# Patient Record
Sex: FEMALE | Race: BLACK OR AFRICAN AMERICAN | Employment: PART TIME | URBAN - METROPOLITAN AREA
[De-identification: names, ages, dates, MRNs, and addresses within clinical notes are randomized per-mention and may not be internally consistent; named-entity substitution may affect disease eponyms.]

---

## 2018-06-28 ENCOUNTER — ED HISTORICAL/CONVERTED ENCOUNTER (OUTPATIENT)
Dept: OTHER | Age: 28
End: 2018-06-28

## 2018-07-05 ENCOUNTER — ED HISTORICAL/CONVERTED ENCOUNTER (OUTPATIENT)
Dept: OTHER | Age: 28
End: 2018-07-05

## 2018-10-26 ENCOUNTER — ED HISTORICAL/CONVERTED ENCOUNTER (OUTPATIENT)
Dept: OTHER | Age: 28
End: 2018-10-26

## 2019-09-04 ENCOUNTER — ED HISTORICAL/CONVERTED ENCOUNTER (OUTPATIENT)
Dept: OTHER | Age: 29
End: 2019-09-04

## 2019-09-30 ENCOUNTER — ED HISTORICAL/CONVERTED ENCOUNTER (OUTPATIENT)
Dept: OTHER | Age: 29
End: 2019-09-30

## 2019-10-03 ENCOUNTER — ED HISTORICAL/CONVERTED ENCOUNTER (OUTPATIENT)
Dept: OTHER | Age: 29
End: 2019-10-03

## 2022-11-01 LAB
ANTIBODY SCREEN, EXTERNAL: NEGATIVE
HBSAG, EXTERNAL: NEGATIVE
HIV, EXTERNAL: NEGATIVE
RPR, EXTERNAL: NORMAL
RUBELLA, EXTERNAL: NORMAL
TYPE, ABO & RH, EXTERNAL: NORMAL

## 2023-01-24 ENCOUNTER — HOSPITAL ENCOUNTER (INPATIENT)
Age: 33
LOS: 2 days | Discharge: HOME | End: 2023-01-26
Attending: STUDENT IN AN ORGANIZED HEALTH CARE EDUCATION/TRAINING PROGRAM | Admitting: STUDENT IN AN ORGANIZED HEALTH CARE EDUCATION/TRAINING PROGRAM
Payer: COMMERCIAL

## 2023-01-24 PROBLEM — O36.4XX0: Status: ACTIVE | Noted: 2023-01-24

## 2023-01-24 LAB
ALBUMIN SERPL-MCNC: 3 G/DL (ref 3.5–5)
ALBUMIN/GLOB SERPL: 0.6 (ref 1.1–2.2)
ALP SERPL-CCNC: 69 U/L (ref 45–117)
ALT SERPL-CCNC: 29 U/L (ref 12–78)
AMPHET UR QL SCN: NEGATIVE
ANION GAP SERPL CALC-SCNC: 7 MMOL/L (ref 5–15)
APTT PPP: 26.7 SEC (ref 22.1–31)
AST SERPL-CCNC: 17 U/L (ref 15–37)
BARBITURATES UR QL SCN: NEGATIVE
BASOPHILS # BLD: 0 K/UL (ref 0–0.1)
BASOPHILS NFR BLD: 0 % (ref 0–1)
BENZODIAZ UR QL: NEGATIVE
BILIRUB SERPL-MCNC: 0.2 MG/DL (ref 0.2–1)
BUN SERPL-MCNC: 8 MG/DL (ref 6–20)
BUN/CREAT SERPL: 12 (ref 12–20)
CALCIUM SERPL-MCNC: 9.3 MG/DL (ref 8.5–10.1)
CANNABINOIDS UR QL SCN: NEGATIVE
CHLORIDE SERPL-SCNC: 106 MMOL/L (ref 97–108)
CO2 SERPL-SCNC: 25 MMOL/L (ref 21–32)
COCAINE UR QL SCN: NEGATIVE
CREAT SERPL-MCNC: 0.69 MG/DL (ref 0.55–1.02)
CREAT UR-MCNC: 99 MG/DL
DIFFERENTIAL METHOD BLD: ABNORMAL
DRUG SCRN COMMENT,DRGCM: NORMAL
EOSINOPHIL # BLD: 0.3 K/UL (ref 0–0.4)
EOSINOPHIL NFR BLD: 3 % (ref 0–7)
ERYTHROCYTE [DISTWIDTH] IN BLOOD BY AUTOMATED COUNT: 12.7 % (ref 11.5–14.5)
FETAL BLOOD VOL PATIENT KLEIH BETKE: NORMAL ML
GLOBULIN SER CALC-MCNC: 4.8 G/DL (ref 2–4)
GLUCOSE SERPL-MCNC: 83 MG/DL (ref 65–100)
HCT VFR BLD AUTO: 28.7 % (ref 35–47)
HGB BLD-MCNC: 9.4 G/DL (ref 11.5–16)
IMM GRANULOCYTES # BLD AUTO: 0 K/UL (ref 0–0.04)
IMM GRANULOCYTES NFR BLD AUTO: 0 % (ref 0–0.5)
INR PPP: 1 (ref 0.9–1.1)
LYMPHOCYTES # BLD: 1.4 K/UL (ref 0.8–3.5)
LYMPHOCYTES NFR BLD: 14 % (ref 12–49)
MCH RBC QN AUTO: 28.9 PG (ref 26–34)
MCHC RBC AUTO-ENTMCNC: 32.8 G/DL (ref 30–36.5)
MCV RBC AUTO: 88.3 FL (ref 80–99)
METHADONE UR QL: NEGATIVE
MONOCYTES # BLD: 1.1 K/UL (ref 0–1)
MONOCYTES NFR BLD: 11 % (ref 5–13)
NEUTS SEG # BLD: 7.4 K/UL (ref 1.8–8)
NEUTS SEG NFR BLD: 72 % (ref 32–75)
NRBC # BLD: 0 K/UL (ref 0–0.01)
NRBC BLD-RTO: 0 PER 100 WBC
OPIATES UR QL: NEGATIVE
PCP UR QL: NEGATIVE
PLATELET # BLD AUTO: 380 K/UL (ref 150–400)
PMV BLD AUTO: 9.6 FL (ref 8.9–12.9)
POTASSIUM SERPL-SCNC: 3.8 MMOL/L (ref 3.5–5.1)
PROT SERPL-MCNC: 7.8 G/DL (ref 6.4–8.2)
PROT UR-MCNC: 16 MG/DL (ref 0–11.9)
PROT/CREAT UR-RTO: 0.2
PROTHROMBIN TIME: 10.3 SEC (ref 9–11.1)
RBC # BLD AUTO: 3.25 M/UL (ref 3.8–5.2)
SODIUM SERPL-SCNC: 138 MMOL/L (ref 136–145)
THERAPEUTIC RANGE,PTTT: NORMAL SECS (ref 58–77)
TSH SERPL DL<=0.05 MIU/L-ACNC: 1.6 UIU/ML (ref 0.36–3.74)
WBC # BLD AUTO: 10.3 K/UL (ref 3.6–11)

## 2023-01-24 PROCEDURE — 86146 BETA-2 GLYCOPROTEIN ANTIBODY: CPT

## 2023-01-24 PROCEDURE — 84156 ASSAY OF PROTEIN URINE: CPT

## 2023-01-24 PROCEDURE — 85460 HEMOGLOBIN FETAL: CPT

## 2023-01-24 PROCEDURE — 80053 COMPREHEN METABOLIC PANEL: CPT

## 2023-01-24 PROCEDURE — 86644 CMV ANTIBODY: CPT

## 2023-01-24 PROCEDURE — 85610 PROTHROMBIN TIME: CPT

## 2023-01-24 PROCEDURE — 3E033VJ INTRODUCTION OF OTHER HORMONE INTO PERIPHERAL VEIN, PERCUTANEOUS APPROACH: ICD-10-PCS | Performed by: OBSTETRICS & GYNECOLOGY

## 2023-01-24 PROCEDURE — 84443 ASSAY THYROID STIM HORMONE: CPT

## 2023-01-24 PROCEDURE — 86747 PARVOVIRUS ANTIBODY: CPT

## 2023-01-24 PROCEDURE — 36415 COLL VENOUS BLD VENIPUNCTURE: CPT

## 2023-01-24 PROCEDURE — 80307 DRUG TEST PRSMV CHEM ANLYZR: CPT

## 2023-01-24 PROCEDURE — 86900 BLOOD TYPING SEROLOGIC ABO: CPT

## 2023-01-24 PROCEDURE — 74011250637 HC RX REV CODE- 250/637: Performed by: OBSTETRICS & GYNECOLOGY

## 2023-01-24 PROCEDURE — 85025 COMPLETE CBC W/AUTO DIFF WBC: CPT

## 2023-01-24 PROCEDURE — 85730 THROMBOPLASTIN TIME PARTIAL: CPT

## 2023-01-24 PROCEDURE — 86147 CARDIOLIPIN ANTIBODY EA IG: CPT

## 2023-01-24 PROCEDURE — 65270000029 HC RM PRIVATE

## 2023-01-24 RX ORDER — ACETAMINOPHEN 325 MG/1
650 TABLET ORAL
Status: DISCONTINUED | OUTPATIENT
Start: 2023-01-24 | End: 2023-01-25 | Stop reason: SDUPTHER

## 2023-01-24 RX ORDER — MISOPROSTOL 200 UG/1
400 TABLET ORAL EVERY 4 HOURS
Status: DISCONTINUED | OUTPATIENT
Start: 2023-01-24 | End: 2023-01-24

## 2023-01-24 RX ORDER — DOCUSATE SODIUM 100 MG/1
100 CAPSULE, LIQUID FILLED ORAL DAILY
COMMUNITY
End: 2023-01-26

## 2023-01-24 RX ORDER — MINERAL OIL
120 OIL (ML) ORAL ONCE
Status: ACTIVE | OUTPATIENT
Start: 2023-01-24 | End: 2023-01-25

## 2023-01-24 RX ORDER — ONDANSETRON 2 MG/ML
8 INJECTION INTRAMUSCULAR; INTRAVENOUS
Status: DISCONTINUED | OUTPATIENT
Start: 2023-01-24 | End: 2023-01-25 | Stop reason: SDUPTHER

## 2023-01-24 RX ORDER — MISOPROSTOL 200 UG/1
200 TABLET ORAL EVERY 4 HOURS
Status: DISCONTINUED | OUTPATIENT
Start: 2023-01-24 | End: 2023-01-25

## 2023-01-24 RX ORDER — MISOPROSTOL 200 UG/1
400 TABLET ORAL
Status: DISCONTINUED | OUTPATIENT
Start: 2023-01-24 | End: 2023-01-24

## 2023-01-24 RX ORDER — FERROUS SULFATE 15 MG/ML
15 DROPS ORAL DAILY
COMMUNITY

## 2023-01-24 RX ORDER — MISOPROSTOL 200 UG/1
800 TABLET ORAL ONCE
Status: DISCONTINUED | OUTPATIENT
Start: 2023-01-24 | End: 2023-01-24

## 2023-01-24 RX ORDER — CALCIUM CARBONATE 200(500)MG
400 TABLET,CHEWABLE ORAL
Status: DISCONTINUED | OUTPATIENT
Start: 2023-01-24 | End: 2023-01-26 | Stop reason: HOSPADM

## 2023-01-24 RX ORDER — DOCUSATE SODIUM 100 MG/1
100 CAPSULE, LIQUID FILLED ORAL
Status: DISCONTINUED | OUTPATIENT
Start: 2023-01-24 | End: 2023-01-25 | Stop reason: SDUPTHER

## 2023-01-24 RX ORDER — NALOXONE HYDROCHLORIDE 0.4 MG/ML
0.4 INJECTION, SOLUTION INTRAMUSCULAR; INTRAVENOUS; SUBCUTANEOUS AS NEEDED
Status: DISCONTINUED | OUTPATIENT
Start: 2023-01-24 | End: 2023-01-25 | Stop reason: SDUPTHER

## 2023-01-24 RX ADMIN — MISOPROSTOL 200 MCG: 200 TABLET ORAL at 18:47

## 2023-01-24 RX ADMIN — MISOPROSTOL 200 MCG: 200 TABLET ORAL at 22:48

## 2023-01-24 NOTE — PROGRESS NOTES
Patient wishes to proceed with induction of labor. Review of Systems   All other systems reviewed and are negative. Visit Vitals  /77 (BP 1 Location: Left arm, BP Patient Position: At rest)   Pulse 64   Temp 98.3 °F (36.8 °C)   Resp 20   SpO2 99%       Physical Exam  Constitutional:       General: She is not in acute distress. Appearance: Normal appearance. Neurological:      Mental Status: She is alert. No results found for this or any previous visit (from the past 12 hour(s)). Active Hospital Problems    Diagnosis Date Noted    Intrauterine fetal death at 25 weeks or more of gestation 01/24/2023       She does not want an autopsy. She agrees to placental examination and desires micorarray DNA testing.   Will start induction of labor with misoprostol 400 mcg po q4h      Jasmin Velez MD    1/24/2023

## 2023-01-24 NOTE — PROGRESS NOTES
Ultrasound Report    Date: 1/24/2023    Sonographer: Daniel Bartholomew MD FACOG FACS    Indication: Suspected intrauterine fetal demise, Patient desires a second opinion    Uterus: Normal    Number of Fetuses: 1 No cardiac activity    Fetus Number: 1    Placenta: Left lateral placenta, clear of the cervix    Amniotic Fluid:  Decreased but a pocket > 2cms    Fetal Movement: Absent    Measurements:   BPD: 5.50 cm  22Weeks 5 Days   HC:   23.15 cm  25Weeks 1 Days   AC:   20.13 cm  24Weeks 5 Days   FL:    5.01 cm  27Weeks 0 Days   EFW: 815 Grams <1 Percentile    FHR:  0 BPM  Anatomy:  Head  Shape: Abnormal  Cavum septi pellucidi: Normal  Midline falx: Normal  Thalami: Not Visualized  Lateral ventricle: Not Visualized  Cerebellum: Not Visualized  Cisterna magna: Not Visualized  Face  Upper lip: Not Visualized  Orbits: Normal  Median profile: Not Visualized  Nose: Not Visualized  Nostrils: Not Visualized  Neck:  edema  Thorax  Shape: Abnormal  No masses: Normal  Heart: Has no activity and anatomy is poorly visualized  Abdomen  Stomach: Normal  Has an abnormal shape  Spine: Normal  Limbs  Poorly visualized      CONCLUSION:  Intrauterine fetal demise confirmed  Breech presentation  I reviewed the diagnosis with her and her family  We talked about autopsy, micro-array DNA testing, and placental pathology  She is thinking about her choices.

## 2023-01-24 NOTE — PROGRESS NOTES
1558 Arrived to unit accompanied by significant other to room #212. Tearful, requesting verification of fetal demise upon arrival to unit. Dr. Laural Leventhal made aware of patient's request and ok with [facility] hospitalist performing US at bedside for confirmation. 1600 Dr. Pardeep La to bedside, Karla Oliveira performed. [address] provided to ask questions/voice concerns. Patient/family allotted privacy at this time. 1925 Bedside shift change report given to [address] [place] [address] (oncoming nurse) by Maya Mccabe RN (offgoing nurse). Report included the following information SBAR, Kardex, MAR, and Recent Results.

## 2023-01-24 NOTE — H&P
History & Physical    Name: Irasema Vazquez MRN: [id number]  SSN: [id number]    YOB: 1990  Age: 33 y.o. Sex: female      Subjective:     Reason for Admission:  Pregnancy and fetal demise    History of Present Illness: Ms. Vidhi Calles is a 33 y.o.  female with a fetal demise at ~28 weeks. Patient presented to the office earlier today with S<D and an ultrasound that showed no fetal cardiac activity. No VB, LOF. No other complaints. MAYTE of 2023, EGA 28 1/7 weeks. OB History    Para Term  AB Living   1             SAB IAB Ectopic Molar Multiple Live Births                    # Outcome Date GA Lbr José Miguel/2nd Weight Sex Delivery Anes PTL Lv   1 Current              Hx of MS  No past surgical history on file. Social History     Occupational History    Not on file   Tobacco Use    Smoking status: Not on file    Smokeless tobacco: Not on file   Substance and Sexual Activity    Alcohol use: Not on file    Drug use: Not on file    Sexual activity: Not on file      No family history on file. No Known Allergies  Prior to Admission medications    Not on File        Review of Systems:  A comprehensive review of systems was negative except for that written in the History of Present Illness. Objective:     Vitals:    Vitals:    23 1709   BP: 118/77   Pulse: 64   Resp: 20   Temp: 98.3 °F (36.8 °C)   SpO2: 99%      Temp (24hrs), Av.3 °F (36.8 °C), Min:98.3 °F (36.8 °C), Max:98.3 °F (36.8 °C)    BP  Min: 118/77  Max: 118/77     Physical Exam:  Patient without distress. Abdomen: soft, nontender  Fundus: soft and non tender  Perineum: blood absent, amniotic fluid absent  Lower Extremities:  - Edema No   - No cords or calf tenderness.      Membranes:  Intact  Uterine Activity:  None  Fetal Heart Rate:  absent       Lab/Data Review:  Recent Results (from the past 24 hour(s))   CBC WITH AUTOMATED DIFF    Collection Time: 23  5:30 PM   Result Value Ref Range    WBC 10.3 3.6 - 11.0 K/uL    RBC 3.25 (L) 3.80 - 5.20 M/uL    HGB 9.4 (L) 11.5 - 16.0 g/dL    HCT 28.7 (L) 35.0 - 47.0 %    MCV 88.3 80.0 - 99.0 FL    MCH 28.9 26.0 - 34.0 PG    MCHC 32.8 30.0 - 36.5 g/dL    RDW 12.7 11.5 - 14.5 %    PLATELET 990 094 - 894 K/uL    MPV 9.6 8.9 - 12.9 FL    NRBC 0.0 0  WBC    ABSOLUTE NRBC 0.00 0.00 - 0.01 K/uL    NEUTROPHILS 72 32 - 75 %    LYMPHOCYTES 14 12 - 49 %    MONOCYTES 11 5 - 13 %    EOSINOPHILS 3 0 - 7 %    BASOPHILS 0 0 - 1 %    IMMATURE GRANULOCYTES 0 0.0 - 0.5 %    ABS. NEUTROPHILS 7.4 1.8 - 8.0 K/UL    ABS. LYMPHOCYTES 1.4 0.8 - 3.5 K/UL    ABS. MONOCYTES 1.1 (H) 0.0 - 1.0 K/UL    ABS. EOSINOPHILS 0.3 0.0 - 0.4 K/UL    ABS. BASOPHILS 0.0 0.0 - 0.1 K/UL    ABS. IMM. GRANS. 0.0 0.00 - 0.04 K/UL    DF AUTOMATED         Assessment and Plan: IUP at 28 1/7 weeks with IUFD--for induction. Will get misoprostol 200-400 po q4 hours.       Active Problems:    Intrauterine fetal death at 25 weeks or more of gestation (1/24/2023)       Fetal demise

## 2023-01-25 ENCOUNTER — ANESTHESIA EVENT (OUTPATIENT)
Dept: LABOR AND DELIVERY | Age: 33
End: 2023-01-25
Payer: COMMERCIAL

## 2023-01-25 ENCOUNTER — ANESTHESIA (OUTPATIENT)
Dept: LABOR AND DELIVERY | Age: 33
End: 2023-01-25
Payer: COMMERCIAL

## 2023-01-25 LAB
ABO + RH BLD: NORMAL
BLOOD GROUP ANTIBODIES SERPL: NORMAL
SPECIMEN EXP DATE BLD: NORMAL

## 2023-01-25 PROCEDURE — 74011250637 HC RX REV CODE- 250/637: Performed by: OBSTETRICS & GYNECOLOGY

## 2023-01-25 PROCEDURE — 65410000002 HC RM PRIVATE OB

## 2023-01-25 PROCEDURE — 74011250637 HC RX REV CODE- 250/637: Performed by: STUDENT IN AN ORGANIZED HEALTH CARE EDUCATION/TRAINING PROGRAM

## 2023-01-25 PROCEDURE — 65270000029 HC RM PRIVATE

## 2023-01-25 PROCEDURE — 74011000250 HC RX REV CODE- 250: Performed by: NURSE ANESTHETIST, CERTIFIED REGISTERED

## 2023-01-25 PROCEDURE — 74011000250 HC RX REV CODE- 250

## 2023-01-25 PROCEDURE — 74011000250 HC RX REV CODE- 250: Performed by: STUDENT IN AN ORGANIZED HEALTH CARE EDUCATION/TRAINING PROGRAM

## 2023-01-25 PROCEDURE — 74011250636 HC RX REV CODE- 250/636: Performed by: STUDENT IN AN ORGANIZED HEALTH CARE EDUCATION/TRAINING PROGRAM

## 2023-01-25 PROCEDURE — 88307 TISSUE EXAM BY PATHOLOGIST: CPT

## 2023-01-25 PROCEDURE — 74011250636 HC RX REV CODE- 250/636: Performed by: OBSTETRICS & GYNECOLOGY

## 2023-01-25 RX ORDER — MISOPROSTOL 200 UG/1
800 TABLET ORAL ONCE
Status: COMPLETED | OUTPATIENT
Start: 2023-01-25 | End: 2023-01-25

## 2023-01-25 RX ORDER — LIDOCAINE HYDROCHLORIDE AND EPINEPHRINE 15; 5 MG/ML; UG/ML
INJECTION, SOLUTION EPIDURAL AS NEEDED
Status: DISCONTINUED | OUTPATIENT
Start: 2023-01-25 | End: 2023-01-25 | Stop reason: HOSPADM

## 2023-01-25 RX ORDER — DOCUSATE SODIUM 100 MG/1
100 CAPSULE, LIQUID FILLED ORAL
Status: DISCONTINUED | OUTPATIENT
Start: 2023-01-25 | End: 2023-01-26 | Stop reason: HOSPADM

## 2023-01-25 RX ORDER — SODIUM CHLORIDE 0.9 % (FLUSH) 0.9 %
5-40 SYRINGE (ML) INJECTION AS NEEDED
Status: DISCONTINUED | OUTPATIENT
Start: 2023-01-25 | End: 2023-01-26 | Stop reason: HOSPADM

## 2023-01-25 RX ORDER — OXYTOCIN/RINGER'S LACTATE 30/500 ML
87.3 PLASTIC BAG, INJECTION (ML) INTRAVENOUS AS NEEDED
Status: DISCONTINUED | OUTPATIENT
Start: 2023-01-25 | End: 2023-01-26 | Stop reason: HOSPADM

## 2023-01-25 RX ORDER — OXYTOCIN/RINGER'S LACTATE 30/500 ML
10 PLASTIC BAG, INJECTION (ML) INTRAVENOUS AS NEEDED
Status: COMPLETED | OUTPATIENT
Start: 2023-01-25 | End: 2023-01-25

## 2023-01-25 RX ORDER — OXYTOCIN/RINGER'S LACTATE 30/500 ML
87.3 PLASTIC BAG, INJECTION (ML) INTRAVENOUS AS NEEDED
Status: DISCONTINUED | OUTPATIENT
Start: 2023-01-25 | End: 2023-01-25 | Stop reason: SDUPTHER

## 2023-01-25 RX ORDER — FENTANYL/BUPIVACAINE/NS/PF 2-1250MCG
1-16 PREFILLED PUMP RESERVOIR EPIDURAL CONTINUOUS
Status: DISCONTINUED | OUTPATIENT
Start: 2023-01-25 | End: 2023-01-26 | Stop reason: HOSPADM

## 2023-01-25 RX ORDER — SODIUM CHLORIDE 0.9 % (FLUSH) 0.9 %
5-40 SYRINGE (ML) INJECTION EVERY 8 HOURS
Status: DISCONTINUED | OUTPATIENT
Start: 2023-01-25 | End: 2023-01-26 | Stop reason: HOSPADM

## 2023-01-25 RX ORDER — IBUPROFEN 800 MG/1
800 TABLET ORAL
Status: DISCONTINUED | OUTPATIENT
Start: 2023-01-25 | End: 2023-01-26 | Stop reason: HOSPADM

## 2023-01-25 RX ORDER — NALOXONE HYDROCHLORIDE 0.4 MG/ML
0.4 INJECTION, SOLUTION INTRAMUSCULAR; INTRAVENOUS; SUBCUTANEOUS AS NEEDED
Status: DISCONTINUED | OUTPATIENT
Start: 2023-01-25 | End: 2023-01-26 | Stop reason: HOSPADM

## 2023-01-25 RX ORDER — SODIUM CHLORIDE, SODIUM LACTATE, POTASSIUM CHLORIDE, CALCIUM CHLORIDE 600; 310; 30; 20 MG/100ML; MG/100ML; MG/100ML; MG/100ML
125 INJECTION, SOLUTION INTRAVENOUS CONTINUOUS
Status: DISCONTINUED | OUTPATIENT
Start: 2023-01-25 | End: 2023-01-26 | Stop reason: HOSPADM

## 2023-01-25 RX ORDER — MISOPROSTOL 200 UG/1
400 TABLET ORAL
Status: DISCONTINUED | OUTPATIENT
Start: 2023-01-25 | End: 2023-01-25

## 2023-01-25 RX ORDER — BUPIVACAINE HYDROCHLORIDE 2.5 MG/ML
INJECTION, SOLUTION EPIDURAL; INFILTRATION; INTRACAUDAL AS NEEDED
Status: DISCONTINUED | OUTPATIENT
Start: 2023-01-25 | End: 2023-01-25 | Stop reason: HOSPADM

## 2023-01-25 RX ORDER — ACETAMINOPHEN 325 MG/1
650 TABLET ORAL
Status: DISCONTINUED | OUTPATIENT
Start: 2023-01-25 | End: 2023-01-26 | Stop reason: HOSPADM

## 2023-01-25 RX ORDER — ONDANSETRON 2 MG/ML
4 INJECTION INTRAMUSCULAR; INTRAVENOUS ONCE
Status: COMPLETED | OUTPATIENT
Start: 2023-01-25 | End: 2023-01-25

## 2023-01-25 RX ORDER — NALOXONE HYDROCHLORIDE 0.4 MG/ML
0.4 INJECTION, SOLUTION INTRAMUSCULAR; INTRAVENOUS; SUBCUTANEOUS AS NEEDED
Status: DISCONTINUED | OUTPATIENT
Start: 2023-01-25 | End: 2023-01-25 | Stop reason: SDUPTHER

## 2023-01-25 RX ORDER — FENTANYL CITRATE 50 UG/ML
100 INJECTION, SOLUTION INTRAMUSCULAR; INTRAVENOUS ONCE
Status: COMPLETED | OUTPATIENT
Start: 2023-01-25 | End: 2023-01-25

## 2023-01-25 RX ORDER — ONDANSETRON 2 MG/ML
4 INJECTION INTRAMUSCULAR; INTRAVENOUS
Status: DISCONTINUED | OUTPATIENT
Start: 2023-01-25 | End: 2023-01-26 | Stop reason: HOSPADM

## 2023-01-25 RX ORDER — MISOPROSTOL 200 UG/1
400 TABLET ORAL EVERY 4 HOURS
Status: DISCONTINUED | OUTPATIENT
Start: 2023-01-25 | End: 2023-01-25

## 2023-01-25 RX ORDER — MISOPROSTOL 100 UG/1
TABLET ORAL
Status: COMPLETED
Start: 2023-01-25 | End: 2023-01-25

## 2023-01-25 RX ORDER — OXYTOCIN/RINGER'S LACTATE 30/500 ML
10 PLASTIC BAG, INJECTION (ML) INTRAVENOUS AS NEEDED
Status: DISCONTINUED | OUTPATIENT
Start: 2023-01-25 | End: 2023-01-26 | Stop reason: HOSPADM

## 2023-01-25 RX ORDER — SIMETHICONE 80 MG
80 TABLET,CHEWABLE ORAL
Status: DISCONTINUED | OUTPATIENT
Start: 2023-01-25 | End: 2023-01-26 | Stop reason: HOSPADM

## 2023-01-25 RX ORDER — DIPHENHYDRAMINE HYDROCHLORIDE 50 MG/ML
25 INJECTION, SOLUTION INTRAMUSCULAR; INTRAVENOUS
Status: DISCONTINUED | OUTPATIENT
Start: 2023-01-25 | End: 2023-01-26 | Stop reason: HOSPADM

## 2023-01-25 RX ADMIN — MISOPROSTOL 400 MCG: 200 TABLET ORAL at 11:05

## 2023-01-25 RX ADMIN — OXYTOCIN 10000 MILLI-UNITS: 10 INJECTION INTRAVENOUS at 11:41

## 2023-01-25 RX ADMIN — MISOPROSTOL 800 MCG: 200 TABLET ORAL at 12:08

## 2023-01-25 RX ADMIN — MISOPROSTOL 400 MCG: 200 TABLET ORAL at 07:34

## 2023-01-25 RX ADMIN — MISOPROSTOL 800 MCG: 100 TABLET ORAL at 12:08

## 2023-01-25 RX ADMIN — MISOPROSTOL 400 MCG: 200 TABLET ORAL at 02:46

## 2023-01-25 RX ADMIN — Medication 10 ML/HR: at 08:22

## 2023-01-25 RX ADMIN — ONDANSETRON HYDROCHLORIDE 4 MG: 2 SOLUTION INTRAMUSCULAR; INTRAVENOUS at 04:30

## 2023-01-25 RX ADMIN — CEFAZOLIN 2 G: 1 INJECTION, POWDER, FOR SOLUTION INTRAMUSCULAR; INTRAVENOUS at 14:13

## 2023-01-25 RX ADMIN — LIDOCAINE HYDROCHLORIDE AND EPINEPHRINE 3 ML: 15; 5 INJECTION, SOLUTION EPIDURAL at 07:33

## 2023-01-25 RX ADMIN — BUPIVACAINE HYDROCHLORIDE 7 ML: 2.5 INJECTION, SOLUTION EPIDURAL; INFILTRATION; INTRACAUDAL; PERINEURAL at 07:35

## 2023-01-25 RX ADMIN — FENTANYL CITRATE 100 MCG: 50 INJECTION, SOLUTION INTRAMUSCULAR; INTRAVENOUS at 05:53

## 2023-01-25 NOTE — PROGRESS NOTES
Spiritual Care Assessment/Progress Note  [address]      NAME: Jasmeet Cavanaugh      MRN: [id number]  AGE: 33 y.o. SEX: female  Sikhism Affiliation: No preference   Language: English     2023     Total Time (in minutes): 45     Spiritual Assessment begun in [facility] 2 LABOR & DELIVERY through conversation with:         [x]Patient        [x] Family    [] Friend(s)        Reason for Consult: Death,  loss     Spiritual beliefs: (Please include comment if needed)     [x] Identifies with a remington tradition:    Anglican     [x] Supported by a remington community:           [] Claims no spiritual orientation:           [] Seeking spiritual identity:                [] Adheres to an individual form of spirituality:           [] Not able to assess:                           Identified resources for coping:      [x] Prayer                               [] Music                  [] Guided Imagery     [x] Family/friends                 [] Pet visits     [] Devotional reading                         [] Unknown     [] Other:                                               Interventions offered during this visit: (See comments for more details)    Patient Interventions: Affirmation of emotions/emotional suffering, Prayer (assurance of),  loss     Family/Friend(s):  Affirmation of emotions/emotional suffering, Prayer (assurance of)     Plan of Care:     [] Support spiritual and/or cultural needs    [] Support AMD and/or advance care planning process      [] Support grieving process   [] Coordinate Rites and/or Rituals    [] Coordination with community clergy   [] No spiritual needs identified at this time   [] Detailed Plan of Care below (See Comments)  [] Make referral to Music Therapy  [] Make referral to Pet Therapy     [] Make referral to Addiction services  [] Make referral to [facility]  [] Make referral to Spiritual Care Partner  [] No future visits requested        [x] Contact Spiritual Care for further referrals     Comments:  Received a phone message notifying Spiritual Care of a fetal demise. Reviewed patient's chart and spoke with patient's nurse. Pt, her Rosemary Ozzy, penny's mother, sister and toddler were present. Pt was in the process of induction. The baby's name is [place]ah [place]. Pt shared her penny's mother is her . Offered condolences to the family. Offered assurance of prayer. Provided a ministry of presence. Collaborated with staff. Please contact Spiritual Care for further referrals.     [address], [address], [address], [facility]  Staff   Paging service: [phone] (AUREA)

## 2023-01-25 NOTE — PROGRESS NOTES
[place] of Care Note    Irasema Vazquez  [id number]  1990   28w2d      S:  Epidural in place, feeling better. Very tired    O:    Visit Vitals  BP (!) 117/56   Pulse 65   Temp 98.9 °F (37.2 °C)   Resp 17   Ht 5' 9\" (1.753 m)   Wt 95.3 kg (210 lb)   SpO2 95%   BMI 31.01 kg/m²     Cervical Exam  Dilation (cm): 0  Eff: 50 %  Station: 0  Vaginal exam done by? : Yaw Vila MD  Membrane Status: Intact      A/P:  33 y.o.  @ 28w2d, undergoing IOL for IUFD   -IOL:    >s/p cytotec 200mcg x2, 400mcg x 2 overnight    >Continue 400mcg q3h     >SVE closed at last check 0430 by Ob hospitalist  -Pain control: PCEA in place, functioning well  -IUFD labs ordered: T&S, CBC, PIH labs, P:C, TSH, KB, Utox coags, glucose. ALL NORMAL thus far. Awaiting Parvovirus, CMV, APAS labs (send out)  -Patient does not desire autopsy. Desires pathology of the placenta.  Still considering microarray testing, unsure.  -Unsure regarding disposition of remains: cremation vs  vs hospital disposition  -FOB present and supportive    Juan Pablo Chaudhry MD  [facility]

## 2023-01-25 NOTE — PROGRESS NOTES
Received a page from the patient' nurse, Norah Anaya, that the patient delivered a baby girl. Patient and family shared that this is God's will. Provided support to the family. Offered condolences. Offered an empathetic presence. Offered assurance of prayer. Advised of  availability. Collaborated with staff. Please contact Spiritual Care for further referrals.     [address], [address], [address], [facility]  Staff   Paging service: [phone] (AUREA)

## 2023-01-25 NOTE — ANESTHESIA PROCEDURE NOTES
Epidural Block    Reason for block: labor epidural  Staffing  Performed: CRNA   Resident/CRNA: Katelyn Arthur CRNA  Preanesthetic Checklist  Completed: patient identified, IV checked, site marked, risks and benefits discussed, surgical consent, monitors and equipment checked, pre-op evaluation, timeout performed and fire risk safety assessment completed and verbalized  Block Placement  Patient position: sitting  Prep: ChloraPrep  Sterility prep: cap, drape, gloves, hand and mask  Sedation level: no sedation  Patient monitoring: heart rate, frequent blood pressure checks and continuous pulse oximetry  Approach: midline  Location: lumbar  Lumbar location: L4-L5  Epidural  Loss of resistance technique: air  Guidance: landmark technique  Needle  Needle type: Tuohy   Needle gauge: 17 G  Needle length: 9 cm  Needle insertion depth: 6 cm  Catheter type: multi-orifice  Catheter size: 20 G  Catheter at skin depth: 11 cm  Catheter securement method: clear occlusive dressing, stabilization device and surgical tape  Test dose: negative  Assessment  Number of attempts: 1  Procedure assessment: patient tolerated procedure well with no immediate complications  Additional Notes  Epidural easily placed x1 attempt. JALEN at 6 cm, catheter easily threaded to 11cm. Neg heme, neg paresthesia, neg csf.

## 2023-01-25 NOTE — L&D DELIVERY NOTE
Delivery Summary    Patient: Mitcheal Ormond MRN: [id number]  SSN: [id number]    YOB: 1990  Age: 33 y.o. Sex: female       Information for the patient's :  Alana Billy Pending FD [[id number]]     Labor Events:    Labor:      Steroids:     Cervical Ripening Date/Time:       Cervical Ripening Type:     Antibiotics During Labor:     Rupture Identifier:      Rupture Date/Time:       Rupture Type:     Amniotic Fluid Volume:      Amniotic Fluid Description:      Amniotic Fluid Odor:      Induction:         Induction Date/Time:        Indications for Induction:      Augmentation:     Augmentation Date/Time:      Indications for Augmentation:     Labor complications: Additional complications:        Delivery Events:  Indications For Episiotomy:     Episiotomy:     Perineal Laceration(s):     Repaired:     Periurethral Laceration Location:      Repaired:     Labial Laceration Location:     Repaired:     Sulcal Laceration Location:     Repaired:     Vaginal Laceration Location:     Repaired:     Cervical Laceration Location:     Repaired:     Repair Suture:     Number of Repair Packets:     Estimated Blood Loss (ml):  ml   Quantitative Blood Loss (ml)                Delivery Date:     Delivery Time:   Delivery Type:    Sex:      Gestational Age: 34w4d   Delivery Clinician:     Living Status: Fetal Demise   Delivery Location: L&D            APGARS  One minute Five minutes Ten minutes   Skin color: 0   0   0     Heart rate: 0   0   0     Grimace: 0   0   0     Muscle tone: 0   0   0     Breathin   0   0     Totals: 0   0   0         Presentation:      Position:        Resuscitation Method:        Meconium Stained:        Cord Information:    Complications:    Cord around:    Delayed cord clamping?     Cord clamped date/time:   Disposition of Cord Blood:      Blood Gases Sent?:      Placenta:  Date/Time:    Removal:        Appearance:       Wilson Measurements:  Birth Weight: Birth Length:        Head Circumference:        Chest Circumference:       Abdominal Girth: Other Providers:    , Obstetrician;Primary Nurse;Primary [place]away Nurse;Nicu Nurse;Neonatologist;Anesthesiologist;Crna;Nurse Practitioner;Midwife;Nursery Nurse         Group B Strep: No results found for: GRBSEXT, GRBSEXT  Information for the patient's :  Roshni Courtney, Pending FD [[id number]]   No results found for: ABORH, PCTABR, PCTDIG, BILI, ABORHEXT, ABORH   No results for input(s): PCO2CB, PO2CB, HCO3I, SO2I, IBD, PTEMPI, SPECTI, PHICB, ISITE, IDEV, IALLEN in the last 72 hours. Delivery Note:     Called to LDR for prolapsing membranes. Ob hospitalist at bedside. Patient was positioned in dorsal lithotomy. The fetal breech was noted to have already delivered. The fetal vertex was gently flexed and delivered without issue. Apgars 0/0. The fetus was handed off to be cleaned and wrapped per the patient's request. The cord was then clamped and cut. Routine postpartum pitocin was given. The placenta delivered spontaneously but did not appear fully intact. Thus a fundal sweep was performed and additional membranes were extracted. A bedside ultrasound was performed and small area of tissue noted at the fundus. A gentle curette was performed using a Banjo curette with additional removal of membranes. Ancef 2gm to be given for manual sweep. Cytotec 800mcg was given DC. Bleeding noted to be hemostatic. The vagina, and perineum were examined and no laceration was found. Patient desired placental pathology and microarray testing. Tissue was collected for microarray and the placenta was sent off for pathology. Patient declined autopsy.  mL. Patient desired to see and hold her baby, ample time was given for this. No complications. Dr. Stann Councilman delivering.      Tj Brannon MD  [facility]

## 2023-01-25 NOTE — PROCEDURES
Obstetrical Discharge Summary     Name: Siri Saha MRN: [id number]  SSN: [id number]    YOB: 1990  Age: 33 y.o. Sex: female      Admit Date: 2023    Discharge Date: 2023    Attending Physician:  Hermila Lunsford MD     Delivering Physician:  Floyd Pickadr MD     * Admission Diagnoses:   Intrauterine fetal demise @ 28w2d      * Discharge Diagnoses:   Delivery of a viable infant via  by Floyd Pickard MD on 2023. Apgars were 0 and 0. Same as above       Additional Diagnoses:   Hospital Problems as of 2023 Never Reviewed            Codes Class Noted - Resolved POA    Intrauterine fetal death at 25 weeks or more of gestation ICD-10-CM: O38. 4XX0  ICD-9-CM: KQB0321  2023 - Present Unknown          Lab Results   Component Value Date/Time    Rubella, External immune 2022 12:00 AM        There is no immunization history on file for this patient. * Procedures:          * Discharge Condition: good    * Hospital Course: Normal hospital course following the delivery. * Disposition: Home    Discharge Medications:   Current Discharge Medication List          * Follow-up Care/Patient Instructions:   Activity: Activity as tolerated  Diet: Regular Diet  Wound Care: As directed  Followup 2 weeks for PP check        Signed By:  Jamison Padron MD     2023

## 2023-01-25 NOTE — PROGRESS NOTES
Labor Progress Note  Patient seen, fetal heart rate and contraction pattern evaluated, patient examined. Patient complains of pain and pressure  Visit Vitals  BP (!) 116/56 (BP 1 Location: Right upper arm, BP Patient Position: At rest)   Pulse 73   Temp 99.8 °F (37.7 °C)   Resp 18   Ht 5' 9\" (1.753 m)   Wt 95.3 kg (210 lb)   SpO2 99%   BMI 31.01 kg/m²       Physical Exam:    33 y.o.  in no acute distress. Cervical Exam:   Presentation Breech  Dilation 0 cms   Effacement 50 %   Station 0  Membranes: Intact  Uterine Activity:   Frequency: Every 2 - 3 minutes   Duration: 60 seconds   Intensity: Moderate  Fetal Heart Rate: 0         Recent Results (from the past 12 hour(s))   CBC WITH AUTOMATED DIFF    Collection Time: 23  5:30 PM   Result Value Ref Range    WBC 10.3 3.6 - 11.0 K/uL    RBC 3.25 (L) 3.80 - 5.20 M/uL    HGB 9.4 (L) 11.5 - 16.0 g/dL    HCT 28.7 (L) 35.0 - 47.0 %    MCV 88.3 80.0 - 99.0 FL    MCH 28.9 26.0 - 34.0 PG    MCHC 32.8 30.0 - 36.5 g/dL    RDW 12.7 11.5 - 14.5 %    PLATELET 781 579 - 701 K/uL    MPV 9.6 8.9 - 12.9 FL    NRBC 0.0 0  WBC    ABSOLUTE NRBC 0.00 0.00 - 0.01 K/uL    NEUTROPHILS 72 32 - 75 %    LYMPHOCYTES 14 12 - 49 %    MONOCYTES 11 5 - 13 %    EOSINOPHILS 3 0 - 7 %    BASOPHILS 0 0 - 1 %    IMMATURE GRANULOCYTES 0 0.0 - 0.5 %    ABS. NEUTROPHILS 7.4 1.8 - 8.0 K/UL    ABS. LYMPHOCYTES 1.4 0.8 - 3.5 K/UL    ABS. MONOCYTES 1.1 (H) 0.0 - 1.0 K/UL    ABS. EOSINOPHILS 0.3 0.0 - 0.4 K/UL    ABS. BASOPHILS 0.0 0.0 - 0.1 K/UL    ABS. IMM.  GRANS. 0.0 0.00 - 0.04 K/UL    DF AUTOMATED     TYPE & SCREEN    Collection Time: 23  5:30 PM   Result Value Ref Range    Crossmatch Expiration 2023,2359     ABO/Rh(D) B POSITIVE     Antibody screen NEG    TSH 3RD GENERATION    Collection Time: 23  5:30 PM   Result Value Ref Range    TSH 1.60 0.36 - 4.12 uIU/mL   METABOLIC PANEL, COMPREHENSIVE    Collection Time: 23  5:30 PM   Result Value Ref Range    Sodium 138 136 - 145 mmol/L    Potassium 3.8 3.5 - 5.1 mmol/L    Chloride 106 97 - 108 mmol/L    CO2 25 21 - 32 mmol/L    Anion gap 7 5 - 15 mmol/L    Glucose 83 65 - 100 mg/dL    BUN 8 6 - 20 MG/DL    Creatinine 0.69 0.55 - 1.02 MG/DL    BUN/Creatinine ratio 12 12 - 20      eGFR >60 >60 ml/min/1.73m2    Calcium 9.3 8.5 - 10.1 MG/DL    Bilirubin, total 0.2 0.2 - 1.0 MG/DL    ALT (SGPT) 29 12 - 78 U/L    AST (SGOT) 17 15 - 37 U/L    Alk. phosphatase 69 45 - 117 U/L    Protein, total 7.8 6.4 - 8.2 g/dL    Albumin 3.0 (L) 3.5 - 5.0 g/dL    Globulin 4.8 (H) 2.0 - 4.0 g/dL    A-G Ratio 0.6 (L) 1.1 - 2.2     PROTHROMBIN TIME + INR    Collection Time: 01/24/23  5:30 PM   Result Value Ref Range    INR 1.0 0.9 - 1.1      Prothrombin time 10.3 9.0 - 11.1 sec   PTT    Collection Time: 01/24/23  5:30 PM   Result Value Ref Range    aPTT 26.7 22.1 - 31.0 sec    aPTT, therapeutic range     58.0 - 77.0 SECS   FETAL HEMOGLOBIN SCREEN    Collection Time: 01/24/23  5:30 PM   Result Value Ref Range    Feto-maternal hemorrhage scrn NO FETOMATERNAL HEMORRHAGE DETECTED    DRUG SCREEN, URINE    Collection Time: 01/24/23  7:16 PM   Result Value Ref Range    AMPHETAMINES Negative NEG      BARBITURATES Negative NEG      BENZODIAZEPINES Negative NEG      COCAINE Negative NEG      METHADONE Negative NEG      OPIATES Negative NEG      PCP(PHENCYCLIDINE) Negative NEG      THC (TH-CANNABINOL) Negative NEG      Drug screen comment (NOTE)    PROTEIN/CREATININE RATIO, URINE    Collection Time: 01/24/23  7:16 PM   Result Value Ref Range    Protein, urine random 16 (H) 0.0 - 11.9 mg/dL    Creatinine, urine random 99.00 mg/dL    Protein/Creat.  urine Ratio 0.2       Assessment/Plan:  Active Hospital Problems    Diagnosis Date Noted    Intrauterine fetal death at 25 weeks or more of gestation 01/24/2023     Will get epidural     Miguel Angel Kuhn MD  1/25/2023

## 2023-01-25 NOTE — ANESTHESIA PREPROCEDURE EVALUATION
Relevant Problems   No relevant active problems       Anesthetic History   No history of anesthetic complications            Review of Systems / Medical History  Patient summary reviewed, nursing notes reviewed and pertinent labs reviewed    Pulmonary  Within defined limits                 Neuro/Psych             Comments: Multiple sclerosis dx in 2017 - off all medication for pregnancy. Sx's included weakness in legs, bells palsy. Cardiovascular  Within defined limits                     GI/Hepatic/Renal  Within defined limits              Endo/Other  Within defined limits           Other Findings   Comments: Induction of labor due to IUFD at 28 wks           Physical Exam    Airway  Mallampati: III  TM Distance: 4 - 6 cm  Neck ROM: normal range of motion        Cardiovascular    Rhythm: regular  Rate: normal         Dental         Pulmonary                 Abdominal         Other Findings            Anesthetic Plan    ASA: 2  Anesthesia type: epidural            Anesthetic plan and risks discussed with: Patient and Spouse      Risks including headache, backache, failure to work and need for replacement, infection and nerve injury discussed with pt. Also discussed affect of epidural on MS symptoms, smaller dose may be needed. Pt chooses to proceed with epidural. Consent was signed. Note entered after procedure.

## 2023-01-25 NOTE — PROGRESS NOTES
0700: Bedside and Verbal shift change report given to Paige Goodman RN (oncoming nurse) by Connie Rivera RN (offgoing nurse). Report included the following information SBAR, Kardex, Intake/Output, MAR, and Recent Results. 0720: Time out for epidural placement with Antoni Johnson CRNA.    8516: Test dose administered by CRNA.     [id number]: [address] at bedside. 1115: Pt having occasional cramping every 4-8 min by palpation and visualization. By palpation, abdomen palpates soft while patient is complaining of the cramping. RN is unable to trace contractions on EFM even after many attempts to adjust TOCO monitor. Pt also changing positions in bed regularly, causing TOCO placement to move. Pt educated to let RN or staff know if she feels pressure or feels like she is cramping to frequently. 1125: Charge RN at bedside, pt complaining of pressure in her vagina. Pt has BBOW and is ready to deliver. Primary RN calling MD River Henry to come to bedside. OB Hospitalist Tabitha called to bedside as well. 1127: MD Lu at bedside. 1136: MD River Henry at bedside. 1138: Delivery time of non-viable infant. Delivery  mL. 1236: Nursing Supervisor Amy Banuelos RN made aware of delivery. 1238:  paged. 1238: RN attemping to call [employer], RN reached a voicemail. RN to call back shortly. 1249: [employer] called again, voicemail left with just this RNs information and call back number. 1253:  here on unit, Leigha Avery. 1255: Timur Zacarias from [place] returned phone call at this time, information given to him about delivery. [employer] releasing body at this time. 1401: Baby taken to nursery at this time by nursery nurse [facility]. 1438: Pt choosing to use [employer] to use cremation services. Consent form filled out at this time. 1524:  Norberto Memos at bedside to answer questions. 1534: ANORA kit taken to pathology at this time.     1540: Patient speaking with [facility]  Home to give information to make arrangements. 1545: Nursing supervisor made aware that patient chose [facility] home for cremation services. 1609: Nursing supervisor on unit to take baby at this time. 1630: Memory box given to patient at this time. FOB at bedside with mom. Pt currently eating dinner. 1644: Pt up to restroom with this RN for first void post delivery. Pt able to ambulate without assistance. Pt voided 350 mL urine. Pad and ice pack changed. Pt also given new pink gown. Epidural catheter removed, catheter intact. 1730: Pt up to restroom for second void post delivery. Pt ambulates without assistance. Pt voided 400 mL. Pad and ice pack changed. 1900: Bedside and Verbal shift change report given to Shekhar Mena (oncoming nurse) by Virgen Hope RN (offgoing nurse). Report included the following information SBAR, Kardex, Procedure Summary, Intake/Output, MAR, and Recent Results.

## 2023-01-25 NOTE — PROGRESS NOTES
2023  3:46 PM  CM assisted MOB in contacting Josr's  home to request information regarding cremation services.  will contact MOB for more details. 2:33 PM  CM met with MOB at bedside to offer condolences. MOB is G1, admitted for fetal demise at ~28 weeks. FOB noted at bedside asleep at time of visit. CM addressed questions with MOB. MOB shared her mother and sister would be arriving for support. CM following for needs. Care Management Interventions  PCP Verified by CM: Yes Rhiannon Schmidt)  Mode of Transport at Discharge:  Other (see comment)  Transition of Care Consult (CM Consult): Discharge Planning  Support Systems: Spouse/Significant Other, Other Family Member(s)  Confirm Follow Up Transport: Family  Discharge Location  Patient Expects to be Discharged to[de-identified] Home with family assistance  Hodan Byrne

## 2023-01-25 NOTE — PROGRESS NOTES
1925: Bedside and Verbal shift change report given to [address] (oncoming nurse) by Coretta Brunson RN (offgoing nurse). Report included the following information SBAR, Kardex, Intake/Output, MAR, and Recent Results. 0350: SVE by Eden Mills MD. Closed/50/0.     0410: Pt bolusing for epidural now. 0425: VORB from Dr. Eden Mills for zofran 4 mg IV to be given. 0530: Dr Carmen Elias called for epidural placement. No answer. 1: Dr. Carmen Elias called for epidural placement. No answer. 0934Earlyne Hivkayy from [place] Mills MD for fentanyl 100 mg IV to be given. 0600: Dr. Carmen Elias called for epidural placement. No answer. 0700: Bedside and Verbal shift change report given to [id number] (oncoming nurse) by Nell Pollack RN (offgoing nurse). Report included the following information SBAR, Kardex, Intake/Output, MAR, and Recent Results.

## 2023-01-26 VITALS
SYSTOLIC BLOOD PRESSURE: 93 MMHG | TEMPERATURE: 98.3 F | WEIGHT: 210 LBS | HEART RATE: 55 BPM | DIASTOLIC BLOOD PRESSURE: 53 MMHG | BODY MASS INDEX: 31.1 KG/M2 | HEIGHT: 69 IN | OXYGEN SATURATION: 97 % | RESPIRATION RATE: 17 BRPM

## 2023-01-26 LAB
B19V IGG SER IA-ACNC: 0.5 INDEX (ref 0–0.8)
B19V IGM SER IA-ACNC: 0.5 INDEX (ref 0–0.8)
B2 GLYCOPROT1 IGA SER-ACNC: 15 GPI IGA UNITS (ref 0–25)
B2 GLYCOPROT1 IGG SER-ACNC: 116 GPI IGG UNITS (ref 0–20)
B2 GLYCOPROT1 IGM SER-ACNC: <9 GPI IGM UNITS (ref 0–32)
CARDIOLIPIN IGA SER IA-ACNC: 15 APL U/ML (ref 0–11)
CARDIOLIPIN IGG SER IA-ACNC: 69 GPL U/ML (ref 0–14)
CARDIOLIPIN IGM SER IA-ACNC: 126 MPL U/ML (ref 0–12)
CMV IGG SERPL IA-ACNC: 5 U/ML (ref 0–0.59)
CMV IGM SERPL IA-ACNC: 37.3 AU/ML (ref 0–29.9)
ERYTHROCYTE [DISTWIDTH] IN BLOOD BY AUTOMATED COUNT: 12.8 % (ref 11.5–14.5)
HCT VFR BLD AUTO: 26.2 % (ref 35–47)
HGB BLD-MCNC: 8.6 G/DL (ref 11.5–16)
MCH RBC QN AUTO: 28.7 PG (ref 26–34)
MCHC RBC AUTO-ENTMCNC: 32.8 G/DL (ref 30–36.5)
MCV RBC AUTO: 87.3 FL (ref 80–99)
NRBC # BLD: 0 K/UL (ref 0–0.01)
NRBC BLD-RTO: 0 PER 100 WBC
PLATELET # BLD AUTO: 305 K/UL (ref 150–400)
PMV BLD AUTO: 9.6 FL (ref 8.9–12.9)
RBC # BLD AUTO: 3 M/UL (ref 3.8–5.2)
WBC # BLD AUTO: 10.4 K/UL (ref 3.6–11)

## 2023-01-26 PROCEDURE — 36415 COLL VENOUS BLD VENIPUNCTURE: CPT

## 2023-01-26 PROCEDURE — 85027 COMPLETE CBC AUTOMATED: CPT

## 2023-01-26 NOTE — PROGRESS NOTES
Reviewed discharge instructions with patient and SO who verbalized understanding. Patient discharged home with SO. Plans to follow up in 1 week with OBGYN.

## 2023-01-26 NOTE — PROGRESS NOTES
PostPartum Note    Cammie Moore  [id number]  1990  33 y.o.    S:  Ms. Cammie Moore is a 33 y.o.  s/p  of an IUFD at 35 weeks. She is coping appropriately. Decided on cremation, declines spiritual services. She and her partner want to go home. States she is not lightheaded or dizzy. Bleeding is very light. She has iron at home. O:   Visit Vitals  BP (!) 93/53 (BP 1 Location: Right upper arm, BP Patient Position: At rest)   Pulse (!) 55   Temp 98.3 °F (36.8 °C)   Resp 17   Ht 5' 9\" (1.753 m)   Wt 95.3 kg (210 lb)   SpO2 97%   BMI 31.01 kg/m²       Gen - No acute distress, laying off to the side, not making eye contact  Respirations - nonlabored   Ext - Warm, well perfused, nontender     Lab Results   Component Value Date/Time    WBC 10.4 2023 06:17 AM    HGB 8.6 (L) 2023 06:17 AM    HCT 26.2 (L) 2023 06:17 AM    PLATELET 991  06:17 AM    MCV 87.3 2023 06:17 AM         A/P:  PPD1 s/p  of IUFD at 28 weeks   1. Routine PP instructions/ care discussed  2. Blood type - Rh pos  3. Anemia - iron, asx  4. IUFD - declines spiritual care, decided on cremation, met with claudia   5. Rtc 1-2 weeks PP check.       Hal Mcnulty MD  [facility]

## 2023-01-26 NOTE — PROGRESS NOTES
2023  10:46 AM    CM met with MOB and FOB for any last minute question. MOB shared she was contacted by  and they have secured services (free of charge). MOB appeared in better spirits today. FOB has been very supportive. No further needs noted at this time.      Aimee Mancia

## 2023-01-26 NOTE — PROGRESS NOTES
1900: Bedside and Verbal shift change report given to [facility] (oncoming nurse) by Emeka Salazar RN (offgoing nurse). Report included the following information SBAR, Kardex, Intake/Output, MAR, and Recent Results. 0720: Bedside and Verbal shift change report given to [facility] (oncoming nurse) by Jennifer Ornelas RN (offgoing nurse). Report included the following information SBAR, Kardex, Procedure Summary, Intake/Output, MAR, and Recent Results.

## 2023-05-25 RX ORDER — FERROUS SULFATE 7.5 MG/0.5
15 SYRINGE (EA) ORAL DAILY
COMMUNITY

## 2023-06-29 ENCOUNTER — HOSPITAL ENCOUNTER (OUTPATIENT)
Facility: HOSPITAL | Age: 33
Discharge: HOME | End: 2023-07-02

## 2023-06-29 ASSESSMENT — ENCOUNTER SYMPTOMS
RESPIRATORY NEGATIVE: 1
EYES NEGATIVE: 1
ALLERGIC/IMMUNOLOGIC NEGATIVE: 1
GASTROINTESTINAL NEGATIVE: 1

## 2024-03-07 ENCOUNTER — TELEPHONE (OUTPATIENT)
Age: 34
End: 2024-03-07

## 2024-03-07 NOTE — TELEPHONE ENCOUNTER
Spoke to patient regarding [facility] get [facility] scheduled for [facility] appointment. Tentatively have patient scheduled for April 10th for GC and U/S - patient states she needs to coordinate her appointments to match her sister's appointments. Will call back.    Addendum: patient called back to confirm April 10th appointment at [facility].

## 2024-03-19 LAB
ABO, EXTERNAL RESULT: NORMAL
C. TRACHOMATIS, EXTERNAL RESULT: NEGATIVE
HEP B, EXTERNAL RESULT: NEGATIVE
HIV, EXTERNAL RESULT: NEGATIVE
N. GONORRHOEAE, EXTERNAL RESULT: NEGATIVE
RH FACTOR, EXTERNAL RESULT: POSITIVE
RPR, EXTERNAL RESULT: REACTIVE
RUBELLA TITER, EXTERNAL RESULT: NORMAL

## 2024-04-10 ENCOUNTER — TELEMEDICINE (OUTPATIENT)
Age: 34
End: 2024-04-10

## 2024-04-10 DIAGNOSIS — O09.291: ICD-10-CM

## 2024-04-10 DIAGNOSIS — Z13.79 ENCOUNTER FOR GENETIC SCREENING: ICD-10-CM

## 2024-04-10 DIAGNOSIS — Z3A.12 12 WEEKS GESTATION OF PREGNANCY: Primary | ICD-10-CM

## 2024-04-10 PROCEDURE — 96040 PR MEDICAL GENETICS COUNSELING EACH 30 MINUTES: CPT | Performed by: COUNSELOR

## 2024-04-10 NOTE — PROGRESS NOTES
Jaleesa Montiel is a 33 y.o. female seen on 04/10/24 in our [place] office for genetic counseling regarding her history of a 28 week IUFD. Jaleesa Montiel will be 34 at the Estimated Date of Delivery: 10/20/24; . Genetic counseling was performed via Telemedicine today. The patient was unaccompanied to her appointment.    Impression and Recommendations:    - The patient's history of a 28 week IUFD and positive antiphospholipid antibody testing was reviewed today.  - The patient's normal NIPT results were reviewed.  - The patient DECLINED both diagnostic testing options at this time.  - The patient's hemoglobin electrophoresis results indicate that she has the benign hemoglobin A2 prime variant and that she is NOT a carrier of sickle cell trait.  - The patient's history of multiple sclerosis was reviewed, including relevant genetic risks.  - An msAFP is recommended between 15 and 24 weeks gestation to screen for open neural tube defects in the current pregnancy.  - The patient is scheduled for an ultrasound and MFM consult on 4/15/24.    Family and pregnancy histories were taken. The following information was discussed with the patient:    Pregnancy History:    Jaleesa had an intrauterine fetal demise (IUFD) in 2023. She saw Dr. Moralez for a preconception MFM consult in 2023 to review her positive antiphospholipid antibody (APLAS) testing. Today we reviewed the information from that appointment as documented in the MFM note (italicized):    \"We discussed that women with multiple sclerosis generally do well during pregnancy. Many times women are less likely to have multiple sclerosis flares during pregnancy. We discussed that she is at increased risk for a flare postpartum. If she is feeling well and is able to perform her usual daily activities, she does not need to start MS medications. We discussed that if she has a flare during pregnancy, she can start Copaxone or steroids. We also

## 2024-04-15 ENCOUNTER — ROUTINE PRENATAL (OUTPATIENT)
Age: 34
End: 2024-04-15
Payer: COMMERCIAL

## 2024-04-15 VITALS
SYSTOLIC BLOOD PRESSURE: 121 MMHG | HEART RATE: 65 BPM | BODY MASS INDEX: 32.14 KG/M2 | DIASTOLIC BLOOD PRESSURE: 76 MMHG | WEIGHT: 217 LBS | HEIGHT: 69 IN

## 2024-04-15 DIAGNOSIS — O36.4XX0 INTRAUTERINE FETAL DEATH AT 20 WEEKS OR MORE OF GESTATION: ICD-10-CM

## 2024-04-15 DIAGNOSIS — Z3A.13 13 WEEKS GESTATION OF PREGNANCY: Primary | ICD-10-CM

## 2024-04-15 PROCEDURE — 76813 OB US NUCHAL MEAS 1 GEST: CPT | Performed by: OBSTETRICS & GYNECOLOGY

## 2024-04-15 PROCEDURE — 99203 OFFICE O/P NEW LOW 30 MIN: CPT | Performed by: OBSTETRICS & GYNECOLOGY

## 2024-04-15 RX ORDER — ENOXAPARIN SODIUM 100 MG/ML
INJECTION SUBCUTANEOUS DAILY
COMMUNITY

## 2024-04-18 NOTE — PROCEDURES
PATIENT: JALEESA JUSTICE   -  : 1990   -  DOS:04/15/2024   -  INTERPRETING PROVIDER:Tamara Saunders,   Indication  ========    1st Trimester, Antiphospholipid syndrome, Hx Intrauterine Fetal Demise (28w2d)    Method  ======    Transabdominal ultrasound examination. View: Good view    Pregnancy  =========    Lamb pregnancy. Number of fetuses: 1    Dating  ======    Ultrasound examination on: 4/15/2024  GA by U/S based upon: CRL  GA by U/S 13 w + 3 d  KATIE by U/S: 10/18/2024  Assigned: based on ultrasound (CRL), selected on 04/15/2024  Assigned GA 13 w + 3 d  Assigned KATIE: 10/18/2024    General Evaluation  ==============    Cardiac activity present  Placenta: posterior  Cord vessels: SUBOPTIMAL  Amniotic fluid: normal amount    Fetal Biometry  ============    Standard   bpm  61% Nicolaides  CRL 72.0 mm 13w 3d 43% Hadlock  NT 1.90 mm  Extended  Nasal bone: present  MVP 3.1 cm    Fetal Anatomy  ===========    The following structures appear normal:  Stomach. Bladder.    The following structures were visualized:  Cranium: Midline falx  Choroid plexus. Face: Profile. Abdominal wall: Intact. Arms. Legs.    Maternal Structures  ===============    Ovaries / Tubes / Adnexa  Rt ovary: Not visualized  Lt ovary: Not visualized    Findings  =======    Living Lamb pregnancy at 13w 3d by clinical dates.  NT measures 1.9 mm.  Anatomy visualized as stated above.  Placental site is posterior.    Consultation  ==========    Jaleesa is a 32 yo  with a hx of fetal demise at 28 weeks due to APS; she is on lovenox 40 mg qd and I instructed her to begin ASA 81mg qd in addition to this.  She reports regular menses prior to this pregnancy. Her MS has been well-controlled off of medication. She does not report other significant history. cffDNA was low-risk.    Patient was counseled on the findings. Questions and concerns were addressed.    Excluding time reading the ultrasound, a total of 40 minutes was spent on

## 2024-06-04 ENCOUNTER — ROUTINE PRENATAL (OUTPATIENT)
Age: 34
End: 2024-06-04
Payer: COMMERCIAL

## 2024-06-04 VITALS — HEART RATE: 69 BPM | DIASTOLIC BLOOD PRESSURE: 67 MMHG | SYSTOLIC BLOOD PRESSURE: 100 MMHG

## 2024-06-04 DIAGNOSIS — Z87.59 HISTORY OF IUFD: Primary | ICD-10-CM

## 2024-06-04 PROCEDURE — 76811 OB US DETAILED SNGL FETUS: CPT | Performed by: OBSTETRICS & GYNECOLOGY

## 2024-06-04 PROCEDURE — 99213 OFFICE O/P EST LOW 20 MIN: CPT | Performed by: OBSTETRICS & GYNECOLOGY

## 2024-06-04 RX ORDER — ASPIRIN 81 MG/1
81 TABLET, CHEWABLE ORAL DAILY
COMMUNITY

## 2024-06-04 NOTE — PROCEDURES
PATIENT: FAVIOLA JUSTICE   -  : 1990   -  DOS:2024   -  INTERPRETING PROVIDER:Satish Ward,   Indication  ========    Antiphospholipid syndrome, Hx Intrauterine Fetal Demise (28w2d)    Method  ======    Transabdominal ultrasound examination. View: Sufficient    Dating  ======    LMP on: 2023  Cycle: regular cycle  GA by LMP 23 w + 3 d  KATIE by LMP: 2024  Previous Ultrasound on: 3/5/2024  Type of prior assessment: GA  GA at prior assessment date 7 w + 2 d  GA by previous U/S 20 w + 2 d  KATIE by previous Ultrasound: 10/20/2024  Ultrasound examination on: 2024  GA by U/S based upon: AC, BPD, Femur, HC  GA by U/S 21 w + 0 d  KATIE by U/S: 10/15/2024  Assigned: based on ultrasound (GA), selected on 2024  Assigned GA 20 w + 2 d  Assigned KATIE: 10/20/2024    Fetal Growth Overview  =================    Exam date        GA              BPD (mm)          HC (mm)              AC (mm)              FL (mm)           HL (mm)            EFW (g)  2024          20w 2d        48.5    66%        180.4     50%        156.4    61%        37     88%        32.7    78%        403     87%    Fetal Biometry  ============    Standard  BPD 48.5 mm 20w 5d 66% Hadlock  OFD 64.0 mm 21w 5d 92% Ryan  .4 mm 20w 3d 50% Hadlock  Cerebellum tr 20.0 mm 19w 2d 35% Hill  Nuchal fold 3.1 mm  .4 mm 20w 6d 61% Hadlock  Femur 37.0 mm 21w 5d 88% Hadlock  Humerus 32.7 mm 21w 0d 78% Ryan   g 21w 0d 87% Hadlock  EFW (lb) 0 lb  EFW (oz) 14 oz  EFW by: Hadlock (BPD-HC-AC-FL)  Extended   7.9 mm  CM 5.2 mm  55% Nicolaides  Nasal bone 7.4 mm  Head / Face / Neck  Nasal bone: present  Other Structures   bpm    General Evaluation  ==============    Cardiac activity present.  bpm. Fetal movements: visualized. Presentation: Cephalic  Placenta: Placental site: anterior, appropriate distance from the internal os. Placental edge-to-cervical os distance 8.3 cm  Umbilical cord: Cord vessels:

## 2024-06-18 ENCOUNTER — TELEPHONE (OUTPATIENT)
Age: 34
End: 2024-06-18

## 2024-06-18 NOTE — TELEPHONE ENCOUNTER
Patient called to rescheduled 4wk appt, Patient will be going out of town and needed an earlier date.

## 2024-06-24 ENCOUNTER — ROUTINE PRENATAL (OUTPATIENT)
Age: 34
End: 2024-06-24
Payer: COMMERCIAL

## 2024-06-24 VITALS
HEIGHT: 69 IN | OXYGEN SATURATION: 99 % | BODY MASS INDEX: 32.12 KG/M2 | SYSTOLIC BLOOD PRESSURE: 114 MMHG | WEIGHT: 216.9 LBS | RESPIRATION RATE: 16 BRPM | HEART RATE: 65 BPM | DIASTOLIC BLOOD PRESSURE: 71 MMHG

## 2024-06-24 DIAGNOSIS — Z87.59 HISTORY OF IUFD: Primary | ICD-10-CM

## 2024-06-24 PROCEDURE — 76816 OB US FOLLOW-UP PER FETUS: CPT | Performed by: OBSTETRICS & GYNECOLOGY

## 2024-06-24 PROCEDURE — 99213 OFFICE O/P EST LOW 20 MIN: CPT | Performed by: OBSTETRICS & GYNECOLOGY

## 2024-06-24 NOTE — PROCEDURES
PATIENT: FAVIOLA JUSTICE   -  : 1990   -  DOS:2024   -  INTERPRETING PROVIDER:Tamara Saunders,   Indication  ========    Antiphospholipid syndrome, Hx Intrauterine Fetal Demise (28w2d)    Method  ======    Transabdominal ultrasound examination. View: Good view    Pregnancy  =========    Lamb pregnancy. Number of fetuses: 1    Dating  ======    LMP on: 2023  Cycle: regular cycle  GA by LMP 26 w + 2 d  KA[place] by LMP: 2024  Previous Ultrasound on: 3/5/2024  Type of prior assessment: GA  GA at prior assessment date 7 w + 2 d  GA by previous U/S 23 w + 1 d  KATIE by previous Ultrasound: 10/20/2024  Ultrasound examination on: 2024  GA by U/S based upon: AC, BPD, Femur, HC  GA by U/S 23 w + 1 d  KATIE by U/S: 10/20/2024  Assigned: based on ultrasound (GA), selected on 2024  Assigned GA 23 w + 1 d  Assigned KATIE: 10/20/2024    Fetal Biometry  ============    Standard  BPD 54.8 mm 22w 5d 28% Hadlock  OFD 76.5 mm 25w 1d 95% Rayn  .6 mm 23w 1d 34% Hadlock  Cerebellum tr 25.5 mm 23w 0d 78% Hill  .8 mm 23w 3d 52% Hadlock  Femur 40.9 mm 23w 2d 42% Hadlock  Humerus 38.6 mm 23w 5d 55% Ryan   g 23w 1d 50% Hadlock  EFW (lb) 1 lb  EFW (oz) 5 oz  EFW by: Hadlock (BPD-HC-AC-FL)  Extended   5.5 mm  CM 6.4 mm  69% Nicolaides  Other Structures   bpm    General Evaluation  ==============    Cardiac activity present.  bpm. Fetal movements: visualized. Presentation: BREECH  Placenta: Placental site: anterior, appropriate distance from the internal os  Umbilical cord: Cord vessels: 3 vessel cord. Insertion site: central  Amniotic fluid: Amount of AF: normal. MVP 5.0 cm    Fetal Anatomy  ===========    4-chamber view: normal  RVOT view: normal  LVOT view: normal  3-vessel view: normal  3-vessel-trachea view: normal  Heart / Thorax  Situs: situs solitus (normal)  Cardiac position: levocardia (normal)  Cardiac rhythm: regular

## 2024-07-23 ENCOUNTER — ROUTINE PRENATAL (OUTPATIENT)
Age: 34
End: 2024-07-23
Payer: COMMERCIAL

## 2024-07-23 VITALS — HEART RATE: 79 BPM | SYSTOLIC BLOOD PRESSURE: 109 MMHG | DIASTOLIC BLOOD PRESSURE: 73 MMHG

## 2024-07-23 DIAGNOSIS — Z87.59 HISTORY OF IUFD: Primary | ICD-10-CM

## 2024-07-23 PROCEDURE — 99213 OFFICE O/P EST LOW 20 MIN: CPT | Performed by: OBSTETRICS & GYNECOLOGY

## 2024-07-23 PROCEDURE — 76816 OB US FOLLOW-UP PER FETUS: CPT | Performed by: OBSTETRICS & GYNECOLOGY

## 2024-07-25 NOTE — PROCEDURES
PATIENT: FAVIOLA JUSTICE   -  : 1990   -  DOS:2024   -  INTERPRETING PROVIDER:Tamara Saunders,   Indication  ========    Antiphospholipid syndrome, Hx Intrauterine Fetal Demise (28w2d)    Method  ======    Transabdominal ultrasound examination. View: Sufficient    Pregnancy  =========    Lamb pregnancy. Number of fetuses: 1    Dating  ======    LMP on: 2023  Cycle: regular cycle  GA by LMP 30 w + 3 d  KATIE by LMP: 2024  Previous Ultrasound on: 3/5/2024  Type of prior assessment: GA  GA at prior assessment date 7 w + 2 d  GA by previous U/S 27 w + 2 d  KATIE by previous Ultrasound: 10/20/2024  Ultrasound examination on: 2024  GA by U/S based upon: AC, BPD, Femur, HC  GA by U/S 28 w + 1 d  KATIE by U/S: 10/14/2024  Assigned: based on ultrasound (GA), selected on 2024  Assigned GA 27 w + 2 d  Assigned KATIE: 10/20/2024    Fetal Biometry  ============    Standard  BPD 69.2 mm 27w 6d 57% Hadlock  OFD 94.5 mm 30w 4d >99% Ryan  .1 mm 28w 1d 50% Hadlock  .7 mm 28w 4d 79% Hadlock  Femur 52.9 mm 28w 1d 61% Hadlock  Humerus 48.8 mm 28w 5d 82% Ryan  EFW 1,207 g 28w 0d 77% Hadlock  EFW (lb) 2 lb  EFW (oz) 11 oz  EFW by: Hadlock (BPD-HC-AC-FL)  Other Structures   bpm    General Evaluation  ==============    Cardiac activity present.  bpm. Fetal movements: visualized. Presentation: BREECH  Placenta: Placental site: anterior, appropriate distance from the internal os  Umbilical cord: Cord vessels: 3 vessel cord. Insertion site: central  Amniotic fluid: Amount of AF: normal. MVP 5.3 cm. SONY 16.8 cm. Q1 5.3 cm, Q2 3.6 cm, Q3 3.3 cm, Q4 4.6 cm    Fetal Anatomy  ===========    Heart / Thorax  Situs: situs solitus (normal)  Cardiac rhythm: regular (normal)  Stomach: normal  Kidneys: normal  Bladder: normal  Wants to know fetal sex: yes    Findings  =======    Intrauterine Lamb pregnancy at 27w 2d by clinical dates.  EFW is 1207 g at 77%, abdominal circumference at

## 2024-08-21 ENCOUNTER — ROUTINE PRENATAL (OUTPATIENT)
Age: 34
End: 2024-08-21
Payer: COMMERCIAL

## 2024-08-21 VITALS — HEART RATE: 79 BPM | SYSTOLIC BLOOD PRESSURE: 120 MMHG | DIASTOLIC BLOOD PRESSURE: 78 MMHG

## 2024-08-21 DIAGNOSIS — G35 MULTIPLE SCLEROSIS (HCC): ICD-10-CM

## 2024-08-21 DIAGNOSIS — Z87.59 HISTORY OF IUFD: Primary | ICD-10-CM

## 2024-08-21 DIAGNOSIS — D68.61 ANTIPHOSPHOLIPID SYNDROME (HCC): ICD-10-CM

## 2024-08-21 PROCEDURE — 99213 OFFICE O/P EST LOW 20 MIN: CPT | Performed by: STUDENT IN AN ORGANIZED HEALTH CARE EDUCATION/TRAINING PROGRAM

## 2024-08-21 PROCEDURE — 76816 OB US FOLLOW-UP PER FETUS: CPT | Performed by: STUDENT IN AN ORGANIZED HEALTH CARE EDUCATION/TRAINING PROGRAM

## 2024-08-21 RX ORDER — POLYETHYLENE GLYCOL 3350 17 G/17G
17 POWDER, FOR SOLUTION ORAL DAILY
COMMUNITY

## 2024-08-21 NOTE — PROCEDURES
PATIENT: FAVIOLA JUSTICE   -  : 1990   -  DOS:2024   -  INTERPRETING PROVIDER:Abigail Girard,   Indication  ========    Antiphospholipid syndrome, Hx Intrauterine Fetal Demise (28w2d), Maternal MS    Method  ======    Transabdominal ultrasound examination. View: Sufficient    Pregnancy  =========    Lamb pregnancy. Number of fetuses: 1    Dating  ======    LMP on: 2023  Cycle: regular cycle  GA by LMP 34 w + 4 d  KATIE by LMP: 2024  Previous Ultrasound on: 3/5/2024  Type of prior assessment: GA  GA at prior assessment date 7 w + 2 d  GA by previous U/S 31 w + 3 d  KATIE by previous Ultrasound: 10/20/2024  Ultrasound examination on: 2024  GA by U/S based upon: AC, BPD, Femur, HC  GA by U/S 31 w + 6 d  KATIE by U/S: 10/17/2024  Assigned: based on ultrasound (GA), selected on 2024  Assigned GA 31 w + 3 d  Assigned KATIE: 10/20/2024    Fetal Biometry  ============    Standard  BPD 81.3 mm 32w 5d 77% Hadlock  .8 mm 33w 0d 86% Ryan  .8 mm 32w 0d 29% Hadlock  .1 mm 31w 1d 40% Hadlock  Femur 60.4 mm 31w 3d 35% Hadlock  EFW 1,772 g 31w 1d 39% Hadlock  EFW (lb) 3 lb  EFW (oz) 15 oz  EFW by: Hadlock (BPD-HC-AC-FL)  Other Structures   bpm    General Evaluation  ==============    Cardiac activity present.  bpm. Fetal movements: visualized. Presentation: Cephalic  Placenta: Placental site: anterior, appropriate distance from the internal os  Umbilical cord: Cord vessels: 3 vessel cord. Insertion site: central  Amniotic fluid: Amount of AF: normal. MVP 4.5 cm. SONY 12.7 cm. Q1 3.9 cm, Q2 2.9 cm, Q3 1.3 cm, Q4 4.5 cm    Fetal Anatomy  ===========    Heart / Thorax  Situs: situs solitus (normal)  Cardiac rhythm: regular (normal)  Stomach: normal  Kidneys: normal  Bladder: normal  Wants to know fetal sex: yes    Findings  =======    Intrauterine Lamb pregnancy at 31w 3d by clinical dates.  EFW is 1772 g at 39%, abdominal circumference at 40%.  Anatomy visualized

## 2024-08-21 NOTE — PROGRESS NOTES
Please see ultrasound report under Imaging tab or Media tab.  Abigail Girard MD  [facility]

## 2024-08-27 ENCOUNTER — ROUTINE PRENATAL (OUTPATIENT)
Age: 34
End: 2024-08-27

## 2024-08-27 VITALS — DIASTOLIC BLOOD PRESSURE: 79 MMHG | SYSTOLIC BLOOD PRESSURE: 128 MMHG | HEART RATE: 73 BPM

## 2024-08-27 DIAGNOSIS — Z87.59 HISTORY OF IUFD: Primary | ICD-10-CM

## 2024-08-27 NOTE — PROCEDURES
PATIENT: JALEESA JUSTICE   -  : 1990   -  DOS:2024   -  INTERPRETING PROVIDER:Karlo Munguia,   Indication  ========    Antiphospholipid syndrome, Hx Intrauterine Fetal Demise (28w2d), Maternal MS    Method  ======    Transabdominal ultrasound examination. View: Sufficient    Pregnancy  =========    Lamb pregnancy. Number of fetuses: 1    Dating  ======    LMP on: 2023  Cycle: regular cycle  GA by LMP 35 w + 3 d  KATIE by LMP: 2024  Previous Ultrasound on: 3/5/2024  Type of prior assessment: GA  GA at prior assessment date 7 w + 2 d  GA by previous U/S 32 w + 2 d  KATIE by previous Ultrasound: 10/20/2024  Assigned: based on ultrasound (GA), selected on 2024  Assigned GA 32 w + 2 d  Assigned KATIE: 10/20/2024    General Evaluation  ==============    Cardiac activity present.  bpm. Fetal movements: visualized. Presentation: Cephalic  Placenta: Placental site: anterior, appropriate distance from the internal os  Umbilical cord: Cord vessels: 3 vessel cord    Fetal Anatomy  ===========    Heart / Thorax  Situs: situs solitus (normal)  Cardiac rhythm: regular (normal)  Stomach: normal  Bladder: normal  Wants to know fetal sex: yes    Amniotic Fluid Assessment  =====================    Amount of AF: normal  MVP 3.6 cm. SONY 12.9 cm. Q1 3.5 cm, Q2 3.1 cm, Q3 2.7 cm, Q4 3.6 cm    Biophysical Profile  ==============    2: Fetal breathing movements  2: Gross body movements  2: Fetal tone  2: Amniotic fluid volume  8/8 Biophysical profile score    Findings  =======    Intrauterine Lamb pregnancy at 32w 2d by clinical dates.  Amniotic fluid: normal.  Placenta is anterior, appropriate distance from the internal os.  Biophysical profile score is 8/8.  Cephalic presentation.    Consultation  ==========    Jaleesa is a 32 yo  with the following issues:    1) hx of fetal demise at 28 weeks/APS  -diagnosed on the basis of anticardiolipin and anti beta 2 glycoprotein antibodies. It is unclear

## 2024-08-30 ENCOUNTER — ROUTINE PRENATAL (OUTPATIENT)
Age: 34
End: 2024-08-30
Payer: COMMERCIAL

## 2024-08-30 VITALS
BODY MASS INDEX: 32.64 KG/M2 | HEART RATE: 78 BPM | WEIGHT: 221 LBS | SYSTOLIC BLOOD PRESSURE: 113 MMHG | DIASTOLIC BLOOD PRESSURE: 69 MMHG | OXYGEN SATURATION: 96 %

## 2024-08-30 DIAGNOSIS — Z87.59 HISTORY OF IUFD: Primary | ICD-10-CM

## 2024-08-30 PROCEDURE — 76819 FETAL BIOPHYS PROFIL W/O NST: CPT | Performed by: OBSTETRICS & GYNECOLOGY

## 2024-08-30 NOTE — PROGRESS NOTES
Chief Complaint   Patient presents with    Pregnancy Ultrasound        /69 (Site: Right Upper Arm, Position: Sitting, Cuff Size: Medium Adult)   Pulse 78   Wt 100.2 kg (221 lb)   LMP 12/21/2023   SpO2 96%   BMI 32.64 kg/m²     Pain Scale: 6/10  Pain Location: Abdomen (side abdominal pain)     Current Outpatient Medications on File Prior to Visit   Medication Sig Dispense Refill    polyethylene glycol (MIRALAX) 17 g PACK packet Take 17 g by mouth as needed      aspirin 81 MG chewable tablet Take 1 tablet by mouth daily      enoxaparin (LOVENOX) 40 MG/0.4ML Inject into the skin daily      Prenatal Vit w/Dj-Pgbewowiq-FQ (PNV PO) Take by mouth      ferrous sulfate (ALBERTO-IN-SOL) 75 (15 Fe) MG/ML solution Take 1 mL by mouth daily       No current facility-administered medications on file prior to visit.       \"Have you been to the ER, urgent care clinic since your last visit?  Hospitalized since your last visit?\"    NO    “Have you seen or consulted any other health care providers outside of [facility] since your last visit?”    NO

## 2024-09-03 ENCOUNTER — ROUTINE PRENATAL (OUTPATIENT)
Age: 34
End: 2024-09-03
Payer: COMMERCIAL

## 2024-09-03 VITALS
DIASTOLIC BLOOD PRESSURE: 70 MMHG | WEIGHT: 221 LBS | BODY MASS INDEX: 32.64 KG/M2 | SYSTOLIC BLOOD PRESSURE: 110 MMHG | HEART RATE: 61 BPM | OXYGEN SATURATION: 98 %

## 2024-09-03 DIAGNOSIS — Z87.59 HISTORY OF IUFD: Primary | ICD-10-CM

## 2024-09-03 PROCEDURE — 76816 OB US FOLLOW-UP PER FETUS: CPT | Performed by: STUDENT IN AN ORGANIZED HEALTH CARE EDUCATION/TRAINING PROGRAM

## 2024-09-03 PROCEDURE — 76819 FETAL BIOPHYS PROFIL W/O NST: CPT | Performed by: STUDENT IN AN ORGANIZED HEALTH CARE EDUCATION/TRAINING PROGRAM

## 2024-09-03 PROCEDURE — 99212 OFFICE O/P EST SF 10 MIN: CPT | Performed by: STUDENT IN AN ORGANIZED HEALTH CARE EDUCATION/TRAINING PROGRAM

## 2024-09-03 PROCEDURE — 99213 OFFICE O/P EST LOW 20 MIN: CPT | Performed by: STUDENT IN AN ORGANIZED HEALTH CARE EDUCATION/TRAINING PROGRAM

## 2024-09-03 NOTE — PROGRESS NOTES
Patient was seen 9/3/2024      Please look under media to view full consult and ultrasound report in ViewPoint.       On this date 9/3/2024 I have spent 15 minutes reviewing previous notes, test results and face to face with the patient discussing the diagnosis and importance of compliance with the treatment plan as well as documenting on the day of the visit.      Leah Johnson MD   Maternal Fetal Medicine

## 2024-09-03 NOTE — PROCEDURES
PATIENT: JALEESA JUSTICE   -  : 1990   -  DOS:2024   -  INTERPRETING PROVIDER:Tamara Saunders,   Indication  ========    Antiphospholipid syndrome, Hx Intrauterine Fetal Demise (28w2d), MS    Method  ======    Transabdominal ultrasound examination. View: Good view    Pregnancy  =========    Lamb pregnancy. Number of fetuses: 1    Dating  ======    LMP on: 2023  Cycle: regular cycle  GA by LMP 35 w + 6 d  KATIE by LMP: 2024  Previous Ultrasound on: 3/5/2024  Type of prior assessment: GA  GA at prior assessment date 7 w + 2 d  GA by previous U/S 32 w + 5 d  KATIE by previous Ultrasound: 10/20/2024  Assigned: based on ultrasound (GA), selected on 2024  Assigned GA 32 w + 5 d  Assigned KATIE: 10/20/2024    General Evaluation  ==============    Cardiac activity present.  bpm. Fetal movements: visualized. Presentation: Cephalic  Placenta: Placental site: anterior, appropriate distance from the internal os  Umbilical cord: Cord vessels: 3 vessel cord    Fetal Anatomy  ===========    Heart / Thorax  Situs: situs solitus (normal)  Cardiac rhythm: regular (normal)  Stomach: normal  Kidneys: normal  Bladder: normal  Wants to know fetal sex: yes    Amniotic Fluid Assessment  =====================    Amount of AF: normal  MVP 4.3 cm. SONY 12.9 cm. Q1 3.1 cm, Q2 4.3 cm, Q3 3.1 cm, Q4 2.5 cm    Biophysical Profile  ==============    2: Fetal breathing movements  2: Gross body movements  2: Fetal tone  2: Amniotic fluid volume  8/8 Biophysical profile score    Findings  =======    Intrauterine Lamb pregnancy at 32w 5d by clinical dates.  Amniotic fluid: normal.  Placenta is anterior, appropriate distance from the internal os.  Biophysical profile score is 8/8.  Cephalic presentation.    Consultation  ==========    Jaleesa is a 32 yo  with the following issues:    1) hx of fetal demise at 28 weeks/APS  -diagnosed on the basis of anticardiolipin and anti beta 2 glycoprotein antibodies.

## 2024-09-03 NOTE — PROCEDURES
PATIENT: JALEESA JUSTICE   -  : 1990   -  DOS:2024   -  INTERPRETING PROVIDER:Leah Johnson,   Indication  ========    Antiphospholipid syndrome, Hx Intrauterine Fetal Demise (28w2d), Maternal MS    Method  ======    External Fetal Monitor and transabdominal ultrasound examination. View: Sufficient    Pregnancy  =========    Lamb pregnancy. Number of fetuses: 1    Dating  ======    LMP on: 2023  Cycle: regular cycle  GA by LMP 36 w + 3 d  KATIE by LMP: 2024  Previous Ultrasound on: 3/5/2024  Type of prior assessment: GA  GA at prior assessment date 7 w + 2 d  GA by previous U/S 33 w + 2 d  KATIE by previous Ultrasound: 10/20/2024  Assigned: based on ultrasound (GA), selected on 2024  Assigned GA 33 w + 2 d  Assigned KATIE: 10/20/2024    General Evaluation  ==============    Cardiac activity present.  bpm. Fetal movements: visualized. Presentation: Cephalic  Placenta: Placental site: anterior, appropriate distance from the internal os  Umbilical cord: Cord vessels: 3 vessel cord    Fetal Anatomy  ===========    Stomach: normal  Kidneys: normal  Bladder: normal  Wants to know fetal sex: yes    Amniotic Fluid Assessment  =====================    Amount of AF: normal  MVP 4.0 cm. SONY 11.9 cm. Q1 2.1 cm, Q2 3.4 cm, Q3 2.4 cm, Q4 4.0 cm    Non Stress Test  =============    NST interpretation: reactive. Test duration 20 min. Baseline  bpm. Baseline variability: moderate. Accelerations: present. Decelerations: absent. Uterine activity:  absent. Acoustic stimulation: no    Findings  =======    Intrauterine Lamb pregnancy at 33w 2d by clinical dates.  Amniotic fluid: normal.  Placenta is anterior, appropriate distance from the internal os.  Cephalic presentation.    NST is reactive. Modified BPP normal.    Consultation  ==========    Jaleesa is a 34 yo  with the following issues:    1) hx of fetal demise at 28 weeks/APS  -diagnosed on the basis of anticardiolipin and anti

## 2024-09-03 NOTE — PROGRESS NOTES
Chief Complaint   Patient presents with    Pregnancy Ultrasound        /70 (Site: Right Upper Arm, Position: Sitting, Cuff Size: Medium Adult)   Pulse 61   Wt 100.2 kg (221 lb)   LMP 12/21/2023   SpO2 98%   BMI 32.64 kg/m²     Pain Scale: 0 - No pain/10  Pain Location:      Current Outpatient Medications on File Prior to Visit   Medication Sig Dispense Refill    polyethylene glycol (MIRALAX) 17 g PACK packet Take 17 g by mouth as needed      aspirin 81 MG chewable tablet Take 1 tablet by mouth daily      enoxaparin (LOVENOX) 40 MG/0.4ML Inject into the skin daily      Prenatal Vit w/Yz-Lwpstcuyo-ZK (PNV PO) Take by mouth      ferrous sulfate (ALBERTO-IN-SOL) 75 (15 Fe) MG/ML solution Take 1 mL by mouth daily       No current facility-administered medications on file prior to visit.       \"Have you been to the ER, urgent care clinic since your last visit?  Hospitalized since your last visit?\"    NO    “Have you seen or consulted any other health care providers outside of [facility] since your last visit?”    NO

## 2024-09-06 ENCOUNTER — ROUTINE PRENATAL (OUTPATIENT)
Age: 34
End: 2024-09-06
Payer: COMMERCIAL

## 2024-09-06 VITALS
DIASTOLIC BLOOD PRESSURE: 72 MMHG | HEART RATE: 54 BPM | OXYGEN SATURATION: 97 % | WEIGHT: 223 LBS | SYSTOLIC BLOOD PRESSURE: 108 MMHG | HEIGHT: 69 IN | RESPIRATION RATE: 17 BRPM | BODY MASS INDEX: 33.03 KG/M2

## 2024-09-06 DIAGNOSIS — Z87.59 HISTORY OF IUFD: Primary | ICD-10-CM

## 2024-09-06 PROCEDURE — 76819 FETAL BIOPHYS PROFIL W/O NST: CPT | Performed by: OBSTETRICS & GYNECOLOGY

## 2024-09-10 ENCOUNTER — ROUTINE PRENATAL (OUTPATIENT)
Age: 34
End: 2024-09-10
Payer: COMMERCIAL

## 2024-09-10 DIAGNOSIS — Z87.59 HISTORY OF IUFD: Primary | ICD-10-CM

## 2024-09-10 PROCEDURE — 76819 FETAL BIOPHYS PROFIL W/O NST: CPT | Performed by: STUDENT IN AN ORGANIZED HEALTH CARE EDUCATION/TRAINING PROGRAM

## 2024-09-10 PROCEDURE — 99213 OFFICE O/P EST LOW 20 MIN: CPT | Performed by: STUDENT IN AN ORGANIZED HEALTH CARE EDUCATION/TRAINING PROGRAM

## 2024-09-13 ENCOUNTER — ROUTINE PRENATAL (OUTPATIENT)
Age: 34
End: 2024-09-13
Payer: COMMERCIAL

## 2024-09-13 VITALS — SYSTOLIC BLOOD PRESSURE: 125 MMHG | DIASTOLIC BLOOD PRESSURE: 73 MMHG | HEART RATE: 62 BPM

## 2024-09-13 DIAGNOSIS — Z87.59 HISTORY OF IUFD: Primary | ICD-10-CM

## 2024-09-13 PROCEDURE — 59025 FETAL NON-STRESS TEST: CPT | Performed by: OBSTETRICS & GYNECOLOGY

## 2024-09-13 PROCEDURE — 76815 OB US LIMITED FETUS(S): CPT | Performed by: OBSTETRICS & GYNECOLOGY

## 2024-09-17 ENCOUNTER — ROUTINE PRENATAL (OUTPATIENT)
Age: 34
End: 2024-09-17
Payer: COMMERCIAL

## 2024-09-17 VITALS — HEART RATE: 63 BPM | SYSTOLIC BLOOD PRESSURE: 114 MMHG | DIASTOLIC BLOOD PRESSURE: 66 MMHG

## 2024-09-17 DIAGNOSIS — Z87.59 HISTORY OF IUFD: Primary | ICD-10-CM

## 2024-09-17 PROCEDURE — 99214 OFFICE O/P EST MOD 30 MIN: CPT | Performed by: STUDENT IN AN ORGANIZED HEALTH CARE EDUCATION/TRAINING PROGRAM

## 2024-09-17 PROCEDURE — 76815 OB US LIMITED FETUS(S): CPT | Performed by: STUDENT IN AN ORGANIZED HEALTH CARE EDUCATION/TRAINING PROGRAM

## 2024-09-17 PROCEDURE — 59025 FETAL NON-STRESS TEST: CPT | Performed by: STUDENT IN AN ORGANIZED HEALTH CARE EDUCATION/TRAINING PROGRAM

## 2024-09-20 ENCOUNTER — ROUTINE PRENATAL (OUTPATIENT)
Age: 34
End: 2024-09-20
Payer: COMMERCIAL

## 2024-09-20 VITALS — SYSTOLIC BLOOD PRESSURE: 114 MMHG | HEART RATE: 77 BPM | DIASTOLIC BLOOD PRESSURE: 71 MMHG

## 2024-09-20 DIAGNOSIS — O99.119 ANTIPHOSPHOLIPID SYNDROME COMPLICATING PREGNANCY, ANTEPARTUM (HCC): Primary | ICD-10-CM

## 2024-09-20 DIAGNOSIS — O09.293 HISTORY OF STILLBIRTH IN PREGNANT PATIENT IN THIRD TRIMESTER, ANTEPARTUM: ICD-10-CM

## 2024-09-20 DIAGNOSIS — D68.61 ANTIPHOSPHOLIPID SYNDROME COMPLICATING PREGNANCY, ANTEPARTUM (HCC): Primary | ICD-10-CM

## 2024-09-20 DIAGNOSIS — G35 MULTIPLE SCLEROSIS AFFECTING PREGNANCY, ANTEPARTUM (HCC): ICD-10-CM

## 2024-09-20 DIAGNOSIS — Z3A.35 35 WEEKS GESTATION OF PREGNANCY: ICD-10-CM

## 2024-09-20 DIAGNOSIS — O99.350 MULTIPLE SCLEROSIS AFFECTING PREGNANCY, ANTEPARTUM (HCC): ICD-10-CM

## 2024-09-20 PROCEDURE — 99213 OFFICE O/P EST LOW 20 MIN: CPT | Performed by: OBSTETRICS & GYNECOLOGY

## 2024-09-20 PROCEDURE — 76819 FETAL BIOPHYS PROFIL W/O NST: CPT | Performed by: OBSTETRICS & GYNECOLOGY

## 2024-09-23 ENCOUNTER — ROUTINE PRENATAL (OUTPATIENT)
Age: 34
End: 2024-09-23
Payer: COMMERCIAL

## 2024-09-23 VITALS — SYSTOLIC BLOOD PRESSURE: 120 MMHG | DIASTOLIC BLOOD PRESSURE: 63 MMHG | HEART RATE: 56 BPM

## 2024-09-23 DIAGNOSIS — D68.61 ANTIPHOSPHOLIPID SYNDROME (HCC): Primary | ICD-10-CM

## 2024-09-23 PROCEDURE — 59025 FETAL NON-STRESS TEST: CPT | Performed by: OBSTETRICS & GYNECOLOGY

## 2024-09-23 PROCEDURE — 76819 FETAL BIOPHYS PROFIL W/O NST: CPT | Performed by: OBSTETRICS & GYNECOLOGY

## 2024-09-23 PROCEDURE — 76815 OB US LIMITED FETUS(S): CPT | Performed by: OBSTETRICS & GYNECOLOGY

## 2024-09-26 LAB — GBS, EXTERNAL RESULT: NEGATIVE

## 2024-09-27 ENCOUNTER — TELEMEDICINE (OUTPATIENT)
Age: 34
End: 2024-09-27

## 2024-09-27 VITALS — SYSTOLIC BLOOD PRESSURE: 108 MMHG | HEART RATE: 75 BPM | DIASTOLIC BLOOD PRESSURE: 73 MMHG

## 2024-09-27 DIAGNOSIS — D68.61 ANTIPHOSPHOLIPID SYNDROME (HCC): Primary | ICD-10-CM

## 2024-10-01 ENCOUNTER — ROUTINE PRENATAL (OUTPATIENT)
Age: 34
End: 2024-10-01
Payer: COMMERCIAL

## 2024-10-01 VITALS — SYSTOLIC BLOOD PRESSURE: 119 MMHG | HEART RATE: 67 BPM | DIASTOLIC BLOOD PRESSURE: 72 MMHG

## 2024-10-01 DIAGNOSIS — O09.293 HISTORY OF STILLBIRTH IN PREGNANT PATIENT IN THIRD TRIMESTER, ANTEPARTUM: ICD-10-CM

## 2024-10-01 DIAGNOSIS — D68.61 ANTIPHOSPHOLIPID SYNDROME (HCC): Primary | ICD-10-CM

## 2024-10-01 DIAGNOSIS — O99.350 MULTIPLE SCLEROSIS AFFECTING PREGNANCY, ANTEPARTUM (HCC): ICD-10-CM

## 2024-10-01 DIAGNOSIS — G35 MULTIPLE SCLEROSIS AFFECTING PREGNANCY, ANTEPARTUM (HCC): ICD-10-CM

## 2024-10-01 PROCEDURE — 59025 FETAL NON-STRESS TEST: CPT | Performed by: OBSTETRICS & GYNECOLOGY

## 2024-10-01 PROCEDURE — 76815 OB US LIMITED FETUS(S): CPT | Performed by: OBSTETRICS & GYNECOLOGY

## 2024-10-01 PROCEDURE — 99213 OFFICE O/P EST LOW 20 MIN: CPT

## 2024-10-01 NOTE — PROGRESS NOTES
Assessment & Plan   ASSESSMENT/PLAN:  1. Antiphospholipid syndrome (HCC)  2. History of stillbirth in pregnant patient in third trimester, antepartum  3. Multiple sclerosis affecting pregnancy, antepartum (HCC)    Reassuring Modified BPP      JALEESA is 35 y/o  who is seen for a pregnancy complicated by:     Antiphospholipid syndrome   -Currently on lovenox and plans to discontinue 24 hours prior to scheduled IOL   -Recommend 6 weeks of prophylaxis lovenox and ldASA in the postpartum period      Hx Intrauterine Fetal Demise (28w2d)  -Continue twice weekly ANT   -LR NIPT  -Kick count instructions, PIH and PTL precautions reviewed.      Maternal MS  -Stable, reports no symptoms.     Recommendations  Continue twice weekly BPP/NST.  Delivery scheduled 10/13/24     Please see [facility]point for ultrasound findings.     Subjective   Jaleesa Montiel (:  1990) is a 34 y.o. female,Established patient, here for evaluation of the following chief complaint(s):  1. Antiphospholipid syndrome (HCC)  2. History of stillbirth in pregnant patient in third trimester, antepartum  3. Multiple sclerosis affecting pregnancy, antepartum (HCC)    Objective   Physical Exam  Vitals reviewed.   Constitutional:       Appearance: Normal appearance.   Neurological:      Mental Status: She is alert.   Psychiatric:         Mood and Affect: Mood normal.         Judgment: Judgment normal.       On this date 10/1/2024 I have spent 25 minutes reviewing previous notes, test results and face to face with the patient discussing the diagnosis and importance of compliance with the treatment plan as well as documenting on the day of the visit.      An electronic signature was used to authenticate this note.    --ROSARIO Davila - CNP

## 2024-10-01 NOTE — PROCEDURES
PATIENT: FAVIOLA JUSTICE   -  : 1990   -  DOS:10/01/2024   -  INTERPRETING PROVIDER:Tamara Saunders,   Indication  ========    Antiphospholipid syndrome, Hx Intrauterine Fetal Demise (28w2d), Maternal MS    Method  ======    External Fetal Monitor and transabdominal ultrasound examination. View: Sufficient    Pregnancy  =========    Lamb pregnancy. Number of fetuses: 1    Dating  ======    LMP on: 2023  Cycle: regular cycle  GA by LMP 40 w + 3 d  KATIE by LMP: 2024  Previous Ultrasound on: 3/5/2024  Type of prior assessment: GA  GA at prior assessment date 7 w + 2 d  GA by previous U/S 37 w + 2 d  KATIE by previous Ultrasound: 10/20/2024  Assigned: based on ultrasound (GA), selected on 2024  Assigned GA 37 w + 2 d  Assigned KATIE: 10/20/2024    General Evaluation  ==============    Cardiac activity present.  bpm. Fetal movements: visualized. Presentation: Cephalic  Placenta: Placental site: anterior, appropriate distance from the internal os  Umbilical cord: Cord vessels: 3 vessel cord    Fetal Anatomy  ===========    Stomach: normal  Kidneys: normal  Bladder: normal  Wants to know fetal sex: yes    Amniotic Fluid Assessment  =====================    Amount of AF: normal  MVP 5.5 cm. SONY 16.1 cm. Q1 5.1 cm, Q2 2.4 cm, Q3 3.2 cm, Q4 5.5 cm    Non Stress Test  =============    NST interpretation: reactive. Test duration 20 min. Baseline  bpm. Baseline variability: moderate. Accelerations: present. Decelerations: absent. Uterine activity:  present, 2 ctx noted. Acoustic stimulation: no    Findings  =======    Intrauterine Lamb pregnancy at 37w 2d by clinical dates.  Amniotic fluid: normal.  Placenta is anterior, appropriate distance from the internal os.  Cephalic presentation.    NST is reactive.    Consultation  ==========    NP note 10/1/24    Reassuring Modified BPP    FAVIOLA is 35 y/o  who is seen for a pregnancy complicated by:    Antiphospholipid

## 2024-10-04 ENCOUNTER — ROUTINE PRENATAL (OUTPATIENT)
Age: 34
End: 2024-10-04
Payer: COMMERCIAL

## 2024-10-04 DIAGNOSIS — D68.61 ANTIPHOSPHOLIPID SYNDROME (HCC): Primary | ICD-10-CM

## 2024-10-04 PROCEDURE — 76819 FETAL BIOPHYS PROFIL W/O NST: CPT | Performed by: OBSTETRICS & GYNECOLOGY

## 2024-10-04 NOTE — PROCEDURES
PATIENT: FAVIOLA JUSTICE   -  : 1990   -  DOS:10/04/2024   -  INTERPRETING PROVIDER:Tamara Saunders,   Indication  ========    Antiphospholipid syndrome, Hx Intrauterine Fetal Demise (28w2d), Maternal MS    Method  ======    Transabdominal ultrasound examination. View: Suboptimal view: limited by late gestational age    Pregnancy  =========    Lamb pregnancy. Number of fetuses: 1    Dating  ======    LMP on: 2023  Cycle: regular cycle  GA by LMP 40 w + 6 d  KATIE by LMP: 2024  Previous Ultrasound on: 3/5/2024  Type of prior assessment: GA  GA at prior assessment date 7 w + 2 d  GA by previous U/S 37 w + 5 d  KATIE by previous Ultrasound: 10/20/2024  Assigned: based on ultrasound (GA), selected on 2024  Assigned GA 37 w + 5 d  Assigned KATIE: 10/20/2024    General Evaluation  ==============    Cardiac activity present.  bpm. Fetal movements: visualized. Presentation: Cephalic  Placenta: Placental site: anterior, appropriate distance from the internal os  Umbilical cord: Cord vessels: 3 vessel cord    Fetal Anatomy  ===========    Stomach: normal  Kidneys: normal  Bladder: normal  Wants to know fetal sex: yes    Amniotic Fluid Assessment  =====================    Amount of AF: normal  MVP 4.6 cm. SONY 16.5 cm. Q1 4.4 cm, Q2 4.6 cm, Q3 4.2 cm, Q4 3.4 cm    Biophysical Profile  ==============    2: Fetal breathing movements  2: Gross body movements  2: Fetal tone  2: Amniotic fluid volume  8/8 Biophysical profile score    Findings  =======    Intrauterine Lamb pregnancy at 37w 5d by clinical dates.  Amniotic fluid: normal.  Placenta is anterior, appropriate distance from the internal os.  Biophysical profile score is 8/8.  Cephalic presentation.    Consultation  ==========    FAVIOLA is 33 y/o  who is seen for a pregnancy complicated by:    Antiphospholipid syndrome  -Currently on lovenox and plans to discontinue 24 hours prior to scheduled IOL  -Recommend 6 weeks of prophylaxis

## 2024-10-06 ENCOUNTER — HOSPITAL ENCOUNTER (INPATIENT)
Facility: HOSPITAL | Age: 34
LOS: 3 days | Discharge: HOME | End: 2024-10-09
Attending: STUDENT IN AN ORGANIZED HEALTH CARE EDUCATION/TRAINING PROGRAM | Admitting: OBSTETRICS & GYNECOLOGY
Payer: COMMERCIAL

## 2024-10-06 LAB
ABO + RH BLD: NORMAL
BLOOD GROUP ANTIBODIES SERPL: NORMAL
ERYTHROCYTE [DISTWIDTH] IN BLOOD BY AUTOMATED COUNT: 13.3 % (ref 11.5–14.5)
HCT VFR BLD AUTO: 29.7 % (ref 35–47)
HGB BLD-MCNC: 10 G/DL (ref 11.5–16)
MCH RBC QN AUTO: 29.7 PG (ref 26–34)
MCHC RBC AUTO-ENTMCNC: 33.7 G/DL (ref 30–36.5)
MCV RBC AUTO: 88.1 FL (ref 80–99)
NRBC # BLD: 0 K/UL (ref 0–0.01)
NRBC BLD-RTO: 0 PER 100 WBC
PLATELET # BLD AUTO: 255 K/UL (ref 150–400)
PMV BLD AUTO: 10.6 FL (ref 8.9–12.9)
RBC # BLD AUTO: 3.37 M/UL (ref 3.8–5.2)
SPECIMEN EXP DATE BLD: NORMAL
WBC # BLD AUTO: 10.3 K/UL (ref 3.6–11)

## 2024-10-06 PROCEDURE — 7210000100 HC LABOR FEE PER 1 HR: Performed by: OBSTETRICS & GYNECOLOGY

## 2024-10-06 PROCEDURE — 2500000003 HC RX 250 WO HCPCS: Performed by: OBSTETRICS & GYNECOLOGY

## 2024-10-06 PROCEDURE — G0378 HOSPITAL OBSERVATION PER HR: HCPCS

## 2024-10-06 PROCEDURE — 6360000002 HC RX W HCPCS: Performed by: OBSTETRICS & GYNECOLOGY

## 2024-10-06 PROCEDURE — 85027 COMPLETE CBC AUTOMATED: CPT

## 2024-10-06 PROCEDURE — 59025 FETAL NON-STRESS TEST: CPT

## 2024-10-06 PROCEDURE — 4A1HXCZ MONITORING OF PRODUCTS OF CONCEPTION, CARDIAC RATE, EXTERNAL APPROACH: ICD-10-PCS | Performed by: OBSTETRICS & GYNECOLOGY

## 2024-10-06 PROCEDURE — 2580000003 HC RX 258: Performed by: OBSTETRICS & GYNECOLOGY

## 2024-10-06 PROCEDURE — 86901 BLOOD TYPING SEROLOGIC RH(D): CPT

## 2024-10-06 PROCEDURE — 1100000000 HC RM PRIVATE

## 2024-10-06 PROCEDURE — 99203 OFFICE O/P NEW LOW 30 MIN: CPT

## 2024-10-06 PROCEDURE — G0379 DIRECT REFER HOSPITAL OBSERV: HCPCS

## 2024-10-06 PROCEDURE — 94761 N-INVAS EAR/PLS OXIMETRY MLT: CPT

## 2024-10-06 PROCEDURE — 86780 TREPONEMA PALLIDUM: CPT

## 2024-10-06 PROCEDURE — 86900 BLOOD TYPING SEROLOGIC ABO: CPT

## 2024-10-06 PROCEDURE — 86850 RBC ANTIBODY SCREEN: CPT

## 2024-10-06 PROCEDURE — 36415 COLL VENOUS BLD VENIPUNCTURE: CPT

## 2024-10-06 RX ORDER — SEVOFLURANE 250 ML/250ML
1 LIQUID RESPIRATORY (INHALATION) CONTINUOUS PRN
Status: DISCONTINUED | OUTPATIENT
Start: 2024-10-06 | End: 2024-10-09 | Stop reason: HOSPADM

## 2024-10-06 RX ORDER — MISOPROSTOL 200 UG/1
400 TABLET ORAL PRN
Status: DISCONTINUED | OUTPATIENT
Start: 2024-10-06 | End: 2024-10-07

## 2024-10-06 RX ORDER — LIDOCAINE HYDROCHLORIDE 10 MG/ML
30 INJECTION, SOLUTION EPIDURAL; INFILTRATION; INTRACAUDAL; PERINEURAL PRN
Status: DISCONTINUED | OUTPATIENT
Start: 2024-10-06 | End: 2024-10-09 | Stop reason: HOSPADM

## 2024-10-06 RX ORDER — METHYLERGONOVINE MALEATE 0.2 MG/ML
200 INJECTION INTRAVENOUS PRN
Status: DISCONTINUED | OUTPATIENT
Start: 2024-10-06 | End: 2024-10-09 | Stop reason: HOSPADM

## 2024-10-06 RX ORDER — HYDROMORPHONE HYDROCHLORIDE 1 MG/ML
1 INJECTION, SOLUTION INTRAMUSCULAR; INTRAVENOUS; SUBCUTANEOUS ONCE
Status: COMPLETED | OUTPATIENT
Start: 2024-10-06 | End: 2024-10-06

## 2024-10-06 RX ORDER — SODIUM CHLORIDE 0.9 % (FLUSH) 0.9 %
5-40 SYRINGE (ML) INJECTION PRN
Status: DISCONTINUED | OUTPATIENT
Start: 2024-10-06 | End: 2024-10-09 | Stop reason: HOSPADM

## 2024-10-06 RX ORDER — TRANEXAMIC ACID 10 MG/ML
1000 INJECTION, SOLUTION INTRAVENOUS
Status: ACTIVE | OUTPATIENT
Start: 2024-10-06 | End: 2024-10-07

## 2024-10-06 RX ORDER — SODIUM CHLORIDE, SODIUM LACTATE, POTASSIUM CHLORIDE, AND CALCIUM CHLORIDE .6; .31; .03; .02 G/100ML; G/100ML; G/100ML; G/100ML
500 INJECTION, SOLUTION INTRAVENOUS PRN
Status: DISCONTINUED | OUTPATIENT
Start: 2024-10-06 | End: 2024-10-09 | Stop reason: HOSPADM

## 2024-10-06 RX ORDER — ONDANSETRON 2 MG/ML
4 INJECTION INTRAMUSCULAR; INTRAVENOUS EVERY 6 HOURS PRN
Status: DISCONTINUED | OUTPATIENT
Start: 2024-10-06 | End: 2024-10-07 | Stop reason: SDUPTHER

## 2024-10-06 RX ORDER — SODIUM CHLORIDE 0.9 % (FLUSH) 0.9 %
5-40 SYRINGE (ML) INJECTION EVERY 12 HOURS SCHEDULED
Status: DISCONTINUED | OUTPATIENT
Start: 2024-10-06 | End: 2024-10-09 | Stop reason: HOSPADM

## 2024-10-06 RX ORDER — PROCHLORPERAZINE EDISYLATE 5 MG/ML
10 INJECTION INTRAMUSCULAR; INTRAVENOUS ONCE
Status: COMPLETED | OUTPATIENT
Start: 2024-10-06 | End: 2024-10-06

## 2024-10-06 RX ORDER — SODIUM CHLORIDE, SODIUM LACTATE, POTASSIUM CHLORIDE, CALCIUM CHLORIDE 600; 310; 30; 20 MG/100ML; MG/100ML; MG/100ML; MG/100ML
INJECTION, SOLUTION INTRAVENOUS CONTINUOUS
Status: DISCONTINUED | OUTPATIENT
Start: 2024-10-06 | End: 2024-10-09 | Stop reason: HOSPADM

## 2024-10-06 RX ORDER — SODIUM CHLORIDE, SODIUM LACTATE, POTASSIUM CHLORIDE, AND CALCIUM CHLORIDE .6; .31; .03; .02 G/100ML; G/100ML; G/100ML; G/100ML
1000 INJECTION, SOLUTION INTRAVENOUS PRN
Status: DISCONTINUED | OUTPATIENT
Start: 2024-10-06 | End: 2024-10-09 | Stop reason: HOSPADM

## 2024-10-06 RX ORDER — SODIUM CHLORIDE 9 MG/ML
25 INJECTION, SOLUTION INTRAVENOUS PRN
Status: DISCONTINUED | OUTPATIENT
Start: 2024-10-06 | End: 2024-10-09 | Stop reason: HOSPADM

## 2024-10-06 RX ORDER — ONDANSETRON 4 MG/1
4 TABLET, ORALLY DISINTEGRATING ORAL EVERY 6 HOURS PRN
Status: DISCONTINUED | OUTPATIENT
Start: 2024-10-06 | End: 2024-10-07 | Stop reason: SDUPTHER

## 2024-10-06 RX ORDER — CARBOPROST TROMETHAMINE 250 UG/ML
250 INJECTION, SOLUTION INTRAMUSCULAR PRN
Status: DISCONTINUED | OUTPATIENT
Start: 2024-10-06 | End: 2024-10-09 | Stop reason: HOSPADM

## 2024-10-06 RX ORDER — TERBUTALINE SULFATE 1 MG/ML
0.25 INJECTION, SOLUTION SUBCUTANEOUS
Status: ACTIVE | OUTPATIENT
Start: 2024-10-06 | End: 2024-10-07

## 2024-10-06 RX ADMIN — HYDROMORPHONE HYDROCHLORIDE 1 MG: 1 INJECTION, SOLUTION INTRAMUSCULAR; INTRAVENOUS; SUBCUTANEOUS at 19:17

## 2024-10-06 RX ADMIN — OXYTOCIN 2 MILLI-UNITS/MIN: 10 INJECTION, SOLUTION INTRAMUSCULAR; INTRAVENOUS at 23:55

## 2024-10-06 RX ADMIN — SODIUM CHLORIDE, POTASSIUM CHLORIDE, SODIUM LACTATE AND CALCIUM CHLORIDE 1000 ML: 600; 310; 30; 20 INJECTION, SOLUTION INTRAVENOUS at 20:07

## 2024-10-06 RX ADMIN — PROCHLORPERAZINE EDISYLATE 10 MG: 5 INJECTION INTRAMUSCULAR; INTRAVENOUS at 19:17

## 2024-10-06 ASSESSMENT — PAIN DESCRIPTION - DESCRIPTORS: DESCRIPTORS: CRAMPING

## 2024-10-06 ASSESSMENT — PAIN SCALES - GENERAL: PAINLEVEL_OUTOF10: 6

## 2024-10-06 ASSESSMENT — PAIN DESCRIPTION - LOCATION: LOCATION: ABDOMEN

## 2024-10-06 NOTE — PROGRESS NOTES
1239: Pt arrives ambulatory to L&D for complaints of contractions since last night. Pt states contractions are irregular but kept her from getting any sleep last night. Pt currently on Lovenox d/t history of fetal demise at 28 weeks and history of APS. Pt states she took her dose of lovenox today. Pt has fetal movement and denies loss of fluid or vaginal bleeding. Pt has induction date scheduled for 10/13/24.    1259: Pt placed on EFM.    1326: Reactive NST noted.    1352: MD Galdamez at bedside to see patient. SVE 1/20/high. MD gave patient the option to go home at this time or walk the unit for a little bit and be rechecked. Pt states she wants to walk the unit and be rechecked.    1500: MD Galdamez at bedside. SVE 3/50/-3. Pt going to continue to ambulate and recheck SVE in a couple hours.     1709: MD Galdamez at bedside, SVE still 3/50/-3. Pt put back on EFM to monitor baby and contractions.     1810: MD Galdamez gives VORB to admit patient at this time.    1900: Bedside and Verbal shift change report given to Lisy ROD (oncoming nurse) by Janine ROD (offgoing nurse). Report included the following information Nurse Handoff Report, Index, Adult Overview, Intake/Output, MAR, Recent Results, and Event Log.

## 2024-10-06 NOTE — H&P
Department of Obstetrics and Gynecology  Attending Obstetrics History and Physical        CHIEF COMPLAINT:  contractions    HISTORY OF PRESENT ILLNESS:      The patient is a 34 y.o.  at 38w0d with h/o IUFD/APLAS with contractions.  Observed on L&D and cervix changed from 1cm to 3cm and started having small variable decelerations with contractions.  Uncomfortable.  Admitted for early labor.      She had lovenox 40mg Ppx with her last dose this AM.      H/o latent syphilis in this pregnancy which was treated.    MS history and this is not under treatment.       OB History    Para Term  AB Living   2 1   1       SAB IAB Ectopic Molar Multiple Live Births                    # Outcome Date GA Lbr Roderick/2nd Weight Sex Type Anes PTL Lv   2 Current            1  23 28w2d / 00:13 0.771 kg (1 lb 11.2 oz)  Vag-Spont EPI Y FD       Past Medical History:        Diagnosis Date    Anemia     Antiphospholipid antibody syndrome (HCC)     Fetal demise, greater than 22 weeks, antepartum, single gestation     28 weeks gestation    Multiple sclerosis (HCC)     Syphilis      Past Surgical History:    History reviewed. No pertinent surgical history.  Social History:    TOBACCO:   reports that she has never smoked. She has never used smokeless tobacco.  Family History:       Problem Relation Age of Onset    Heart Failure Mother      Medications Prior to Admission:  Medications Prior to Admission: polyethylene glycol (MIRALAX) 17 g PACK packet, Take 17 g by mouth as needed  aspirin 81 MG chewable tablet, Take 1 tablet by mouth daily  enoxaparin (LOVENOX) 40 MG/0.4ML, Inject into the skin daily  Prenatal Vit w/Sr-Pmnianzzh-OQ (PNV PO), Take by mouth  ferrous sulfate (ALBERTO-IN-SOL) 75 (15 Fe) MG/ML solution, Take 1 mL by mouth daily  Allergies:  Patient has no known allergies.    REVIEW OF SYSTEMS:    Contractions  No vb/lof  Good FM.          PHYSICAL EXAM:  Vitals:    10/06/24 1303   BP: 131/80   Pulse: 72

## 2024-10-07 ENCOUNTER — ANESTHESIA EVENT (OUTPATIENT)
Facility: HOSPITAL | Age: 34
End: 2024-10-07
Payer: COMMERCIAL

## 2024-10-07 ENCOUNTER — ANESTHESIA (OUTPATIENT)
Facility: HOSPITAL | Age: 34
End: 2024-10-07
Payer: COMMERCIAL

## 2024-10-07 PROCEDURE — 51701 INSERT BLADDER CATHETER: CPT

## 2024-10-07 PROCEDURE — 7210000100 HC LABOR FEE PER 1 HR: Performed by: OBSTETRICS & GYNECOLOGY

## 2024-10-07 PROCEDURE — 1120000000 HC RM PRIVATE OB

## 2024-10-07 PROCEDURE — 3700000156 HC EPIDURAL ANESTHESIA: Performed by: ANESTHESIOLOGY

## 2024-10-07 PROCEDURE — 0HQ9XZZ REPAIR PERINEUM SKIN, EXTERNAL APPROACH: ICD-10-PCS | Performed by: OBSTETRICS & GYNECOLOGY

## 2024-10-07 PROCEDURE — 2500000003 HC RX 250 WO HCPCS: Performed by: ANESTHESIOLOGY

## 2024-10-07 PROCEDURE — 6360000002 HC RX W HCPCS: Performed by: OBSTETRICS & GYNECOLOGY

## 2024-10-07 PROCEDURE — 51702 INSERT TEMP BLADDER CATH: CPT

## 2024-10-07 PROCEDURE — 94761 N-INVAS EAR/PLS OXIMETRY MLT: CPT

## 2024-10-07 PROCEDURE — 7220000101 HC DELIVERY VAGINAL/SINGLE: Performed by: OBSTETRICS & GYNECOLOGY

## 2024-10-07 PROCEDURE — 6370000000 HC RX 637 (ALT 250 FOR IP): Performed by: OBSTETRICS & GYNECOLOGY

## 2024-10-07 PROCEDURE — 2580000003 HC RX 258: Performed by: OBSTETRICS & GYNECOLOGY

## 2024-10-07 PROCEDURE — 3700000025 EPIDURAL BLOCK: Performed by: STUDENT IN AN ORGANIZED HEALTH CARE EDUCATION/TRAINING PROGRAM

## 2024-10-07 PROCEDURE — 2500000003 HC RX 250 WO HCPCS: Performed by: OBSTETRICS & GYNECOLOGY

## 2024-10-07 RX ORDER — PROCHLORPERAZINE EDISYLATE 5 MG/ML
10 INJECTION INTRAMUSCULAR; INTRAVENOUS ONCE
Status: COMPLETED | OUTPATIENT
Start: 2024-10-07 | End: 2024-10-07

## 2024-10-07 RX ORDER — LANOLIN/MINERAL OIL
LOTION (ML) TOPICAL PRN
Status: DISCONTINUED | OUTPATIENT
Start: 2024-10-07 | End: 2024-10-09 | Stop reason: HOSPADM

## 2024-10-07 RX ORDER — SODIUM CHLORIDE 0.9 % (FLUSH) 0.9 %
5-40 SYRINGE (ML) INJECTION PRN
Status: DISCONTINUED | OUTPATIENT
Start: 2024-10-07 | End: 2024-10-09 | Stop reason: HOSPADM

## 2024-10-07 RX ORDER — MISOPROSTOL 200 UG/1
800 TABLET ORAL PRN
Status: DISCONTINUED | OUTPATIENT
Start: 2024-10-07 | End: 2024-10-09 | Stop reason: HOSPADM

## 2024-10-07 RX ORDER — DOCUSATE SODIUM 100 MG/1
100 CAPSULE, LIQUID FILLED ORAL 2 TIMES DAILY
Status: DISCONTINUED | OUTPATIENT
Start: 2024-10-07 | End: 2024-10-09 | Stop reason: HOSPADM

## 2024-10-07 RX ORDER — LIDOCAINE HYDROCHLORIDE AND EPINEPHRINE 15; 5 MG/ML; UG/ML
INJECTION, SOLUTION EPIDURAL
Status: DISCONTINUED | OUTPATIENT
Start: 2024-10-07 | End: 2024-10-07 | Stop reason: SDUPTHER

## 2024-10-07 RX ORDER — HYDROMORPHONE HYDROCHLORIDE 1 MG/ML
1 INJECTION, SOLUTION INTRAMUSCULAR; INTRAVENOUS; SUBCUTANEOUS ONCE
Status: COMPLETED | OUTPATIENT
Start: 2024-10-07 | End: 2024-10-07

## 2024-10-07 RX ORDER — FENTANYL/BUPIVACAINE/NS/PF 2-1250MCG
13 PLASTIC BAG, INJECTION (ML) INJECTION CONTINUOUS
Status: DISCONTINUED | OUTPATIENT
Start: 2024-10-07 | End: 2024-10-09 | Stop reason: HOSPADM

## 2024-10-07 RX ORDER — ONDANSETRON 4 MG/1
4 TABLET, ORALLY DISINTEGRATING ORAL EVERY 6 HOURS PRN
Status: DISCONTINUED | OUTPATIENT
Start: 2024-10-07 | End: 2024-10-09 | Stop reason: HOSPADM

## 2024-10-07 RX ORDER — ACETAMINOPHEN 500 MG
1000 TABLET ORAL EVERY 8 HOURS
Status: DISCONTINUED | OUTPATIENT
Start: 2024-10-07 | End: 2024-10-09 | Stop reason: HOSPADM

## 2024-10-07 RX ORDER — SODIUM CHLORIDE, SODIUM LACTATE, POTASSIUM CHLORIDE, CALCIUM CHLORIDE 600; 310; 30; 20 MG/100ML; MG/100ML; MG/100ML; MG/100ML
INJECTION, SOLUTION INTRAVENOUS CONTINUOUS
Status: DISCONTINUED | OUTPATIENT
Start: 2024-10-07 | End: 2024-10-09 | Stop reason: HOSPADM

## 2024-10-07 RX ORDER — SODIUM CHLORIDE 0.9 % (FLUSH) 0.9 %
5-40 SYRINGE (ML) INJECTION EVERY 12 HOURS SCHEDULED
Status: DISCONTINUED | OUTPATIENT
Start: 2024-10-07 | End: 2024-10-09 | Stop reason: HOSPADM

## 2024-10-07 RX ORDER — OXYCODONE HYDROCHLORIDE 5 MG/1
10 TABLET ORAL EVERY 4 HOURS PRN
Status: DISCONTINUED | OUTPATIENT
Start: 2024-10-07 | End: 2024-10-09 | Stop reason: HOSPADM

## 2024-10-07 RX ORDER — ENOXAPARIN SODIUM 100 MG/ML
40 INJECTION SUBCUTANEOUS DAILY
Status: DISCONTINUED | OUTPATIENT
Start: 2024-10-08 | End: 2024-10-09 | Stop reason: HOSPADM

## 2024-10-07 RX ORDER — ONDANSETRON 2 MG/ML
4 INJECTION INTRAMUSCULAR; INTRAVENOUS EVERY 6 HOURS PRN
Status: DISCONTINUED | OUTPATIENT
Start: 2024-10-07 | End: 2024-10-09 | Stop reason: HOSPADM

## 2024-10-07 RX ORDER — IBUPROFEN 800 MG/1
800 TABLET, FILM COATED ORAL EVERY 8 HOURS SCHEDULED
Status: DISCONTINUED | OUTPATIENT
Start: 2024-10-07 | End: 2024-10-09 | Stop reason: HOSPADM

## 2024-10-07 RX ORDER — EPHEDRINE SULFATE/0.9% NACL/PF 25 MG/5 ML
10 SYRINGE (ML) INTRAVENOUS EVERY 5 MIN PRN
Status: DISCONTINUED | OUTPATIENT
Start: 2024-10-07 | End: 2024-10-09 | Stop reason: HOSPADM

## 2024-10-07 RX ORDER — ONDANSETRON 2 MG/ML
4 INJECTION INTRAMUSCULAR; INTRAVENOUS EVERY 6 HOURS PRN
Status: DISCONTINUED | OUTPATIENT
Start: 2024-10-07 | End: 2024-10-07 | Stop reason: SDUPTHER

## 2024-10-07 RX ORDER — SODIUM CHLORIDE 9 MG/ML
INJECTION, SOLUTION INTRAVENOUS PRN
Status: DISCONTINUED | OUTPATIENT
Start: 2024-10-07 | End: 2024-10-09 | Stop reason: HOSPADM

## 2024-10-07 RX ORDER — HYDROMORPHONE HYDROCHLORIDE 2 MG/1
1 TABLET ORAL ONCE
Status: DISCONTINUED | OUTPATIENT
Start: 2024-10-07 | End: 2024-10-07

## 2024-10-07 RX ORDER — NALOXONE HYDROCHLORIDE 0.4 MG/ML
INJECTION, SOLUTION INTRAMUSCULAR; INTRAVENOUS; SUBCUTANEOUS PRN
Status: DISCONTINUED | OUTPATIENT
Start: 2024-10-07 | End: 2024-10-09 | Stop reason: HOSPADM

## 2024-10-07 RX ORDER — OXYCODONE HYDROCHLORIDE 5 MG/1
5 TABLET ORAL EVERY 4 HOURS PRN
Status: DISCONTINUED | OUTPATIENT
Start: 2024-10-07 | End: 2024-10-09 | Stop reason: HOSPADM

## 2024-10-07 RX ADMIN — LIDOCAINE HYDROCHLORIDE AND EPINEPHRINE 5 ML: 15; 5 INJECTION, SOLUTION EPIDURAL; INFILTRATION; INTRACAUDAL; PERINEURAL at 02:05

## 2024-10-07 RX ADMIN — MISOPROSTOL 800 MCG: 200 TABLET ORAL at 19:04

## 2024-10-07 RX ADMIN — IBUPROFEN 800 MG: 800 TABLET, FILM COATED ORAL at 21:53

## 2024-10-07 RX ADMIN — HYDROMORPHONE HYDROCHLORIDE 1 MG: 1 INJECTION, SOLUTION INTRAMUSCULAR; INTRAVENOUS; SUBCUTANEOUS at 00:47

## 2024-10-07 RX ADMIN — DOCUSATE SODIUM 100 MG: 100 CAPSULE, LIQUID FILLED ORAL at 19:50

## 2024-10-07 RX ADMIN — ACETAMINOPHEN 1000 MG: 500 TABLET ORAL at 19:50

## 2024-10-07 RX ADMIN — SODIUM CHLORIDE, POTASSIUM CHLORIDE, SODIUM LACTATE AND CALCIUM CHLORIDE: 600; 310; 30; 20 INJECTION, SOLUTION INTRAVENOUS at 02:22

## 2024-10-07 RX ADMIN — Medication 13 ML/HR: at 09:08

## 2024-10-07 RX ADMIN — METHYLERGONOVINE MALEATE 200 MCG: 0.2 INJECTION, SOLUTION INTRAMUSCULAR; INTRAVENOUS at 18:06

## 2024-10-07 RX ADMIN — Medication 166.7 ML: at 17:23

## 2024-10-07 RX ADMIN — OXYCODONE HYDROCHLORIDE 10 MG: 5 TABLET ORAL at 19:50

## 2024-10-07 RX ADMIN — OXYTOCIN 10 MILLI-UNITS/MIN: 10 INJECTION, SOLUTION INTRAMUSCULAR; INTRAVENOUS at 15:16

## 2024-10-07 RX ADMIN — Medication 13 ML/HR: at 16:30

## 2024-10-07 RX ADMIN — Medication 13 ML/HR: at 02:28

## 2024-10-07 RX ADMIN — SODIUM CHLORIDE, POTASSIUM CHLORIDE, SODIUM LACTATE AND CALCIUM CHLORIDE 1000 ML: 600; 310; 30; 20 INJECTION, SOLUTION INTRAVENOUS at 01:10

## 2024-10-07 RX ADMIN — PROCHLORPERAZINE EDISYLATE 10 MG: 5 INJECTION INTRAMUSCULAR; INTRAVENOUS at 00:46

## 2024-10-07 RX ADMIN — LIDOCAINE HYDROCHLORIDE AND EPINEPHRINE 3 ML: 15; 5 INJECTION, SOLUTION EPIDURAL; INFILTRATION; INTRACAUDAL; PERINEURAL at 02:02

## 2024-10-07 ASSESSMENT — PAIN SCALES - GENERAL
PAINLEVEL_OUTOF10: 4
PAINLEVEL_OUTOF10: 8
PAINLEVEL_OUTOF10: 7
PAINLEVEL_OUTOF10: 0

## 2024-10-07 ASSESSMENT — PAIN DESCRIPTION - LOCATION
LOCATION: VAGINA
LOCATION: VAGINA;ABDOMEN

## 2024-10-07 ASSESSMENT — PAIN DESCRIPTION - DESCRIPTORS: DESCRIPTORS: ACHING;SORE

## 2024-10-07 NOTE — PROGRESS NOTES
0700  Bedside and Verbal shift change report given to PHILLIP Hunt  (oncoming nurse) by DERRICK Tucker  (offgoing nurse). Report included the following information Adult Overview, Intake/Output, MAR, Recent Results, and Med Rec Status.    0712  MD Louis called to bedside for prolonged decel, see MD note   0756  MD grande at bedside to discuss POC, SVE 7/70/-2, FSE replaced at this time.  1118  SVE 8/80/-2  1433  MD Grande at bedside, SVE 8/80/0   1556  MD Grande at bedside, SVE unchanged at 8/80/0  1644  MD Grande SVE anterior lip/100/0  1717  SVE 10/100/+2, MD notified. Ok to start pushing at this time.  Patient actively pushing.  RN remains in continuous attendance at the bedside.  Assessment & evaluation of fetal heart rate ongoing via continuous EFM.   1719  MD Diaz at bedside for delivery   1720  RN remained at bedside throughout pushing.  EFM continuously assessed.  Vaginal delivery of viable infant.     Delivery     1800  MD TAN Diaz called via phone for vaginal bleeding, see MAR for orders   1900  MD at bedside for vaginal bleeding see Mar for orders. See MD note.      2 hr

## 2024-10-07 NOTE — PROGRESS NOTES
Labor Note    Jaleesa Montiel  [id number]  1990   38w1d      S:  Feeling comfortable with epidural    O:    /62   Pulse 60   Temp 98.4 °F (36.9 °C) (Oral)   Resp 16   LMP 2023   SpO2 100%        Cervix /-2  FSE replaced  IUPC in place    A/P:  34 y.o.  @ 38w1d- labor   - pitocin started overnight. Stopped this AM due to decelerations. Making cervical change. AROM around 0600a   1.  CEFM/[place]  2.  GBS neg / Rhpos  3.  Pitocin as needed  4.  Pain control - epidural  5.  Lucy 2 hours, or prn.  Expect .      Lala Grande MD  [facility]

## 2024-10-07 NOTE — PROGRESS NOTES
The patient had a prolonged feal heat rate deceleration for 3 minutes that recovered with maternal repositioning to left side in trendelenburg. EFM replaced because FSE tracing had a lot of artifact.  FHR: 135 moderate variability, prolonged deceleration for 3 minutes followed by return to baseline  IUPC: contractions q 1-4 minutes  Continue to closely monitor the fetal tracing.

## 2024-10-07 NOTE — L&D DELIVERY NOTE
Angel Montiel [[id number]]      Labor Events     Labor: No  Cervical Ripening Date/Time:        Rupture Date/Time:  10/7/24 06:19:00   Rupture Type: AROM  Fluid Color: Clear  Fluid Odor: None  Induction: None  Labor Complications: None       Anesthesia    Method: Epidural       Labor Event Times      Labor onset date/time:        Dilation complete date/time:  10/7/24 17:00:00     Start pushing date/time:  10/7/2024 17:13:00   Decision date/time (emergent ):            Delivery Details: The patient underwent a vaginal delivery for a viable male infant , CHARISSA, with APGARS 8/9. The placenta was removed intact with a 3 vessel cord. Epidural was used. A first degree laceration was repaired with 2-0 chromic and a L labial laceration was repaired with 3-0 chromic. EBL was about 300cc. The patient tolerated the delivery in stable condition.     Delivery Date: 10/7/24 Delivery Time: 17:20:00   Delivery Type: Vaginal, Spontaneous               Presentation    Presentation: Vertex  Position: Right  _: Occiput  _: Anterior       Shoulder Dystocia    Shoulder Dystocia Present?: No       Assisted Delivery Details    Forceps Attempted?: No  Vacuum Extractor Attempted?: No                           Cord    Vessels: 3 Vessels  Complications: None  Delayed Cord Clamping?: Yes  Cord Clamped Date/Time: 10/7/2024 17:21:00  Cord Blood Disposition: Discard  Gases Sent?: No              Placenta    Date/Time: 10/7/2024 17:24:03  Removal: Spontaneous  Appearance: Intact  Disposition: Discarded       Lacerations    Episiotomy: None  Perineal Lacerations: 1st  Other Lacerations: labial laceration  Labial Laceration: left Repaired?: Yes          Vaginal Counts    Initial Count Personnel: TRACIE  Initial Count Verified By: DARIA  Intial Sponge Count: Correct Intial Needles Count: Correct Intial Instruments Count: Correct   Final Sponges Count: Correct Final Needles  Count: Correct Final Instruments Count: Correct

## 2024-10-07 NOTE — PROGRESS NOTES
Heriberto QURESHI instructed RN to hold off on PreE labs for now and to continue to monitor BP's. RN & MD to reassess throughout the night to determine need for labs. No other concerns noted.  Vitals:    10/06/24 1303 10/06/24 1917 10/06/24 2014 10/06/24 2032   BP: 131/80 (!) 141/84  120/67   Pulse: 72 66 52 69   Resp: 16 17     Temp: 98.6 °F (37 °C) 98.6 °F (37 °C)     TempSrc: Oral Oral     SpO2: 99% 99% 96%

## 2024-10-07 NOTE — ANESTHESIA PROCEDURE NOTES
Epidural Block    Patient location during procedure: OB  Start time: 10/7/2024 1:58 AM  End time: 10/7/2024 2:02 AM  Reason for block: labor epidural  Staffing  Anesthesiologist: Kyle Jameson MD  Performed by: Kyle Jameson MD  Authorized by: Kyle Jameson MD    Epidural  Patient position: sitting  Prep: Betadine  Patient monitoring: frequent blood pressure checks and continuous pulse ox  Approach: midline  Location: L3-4  Injection technique: CELESTINA air  Provider prep: mask and sterile gloves  Needle  Needle type: Tuohy   Needle gauge: 17 G  Needle length: 3.5 in  Needle insertion depth: 6 cm  Catheter type: multi-orifice  Catheter size: 20 G  Catheter at skin depth: 11 cm  Test dose: negativeCatheter Secured: tegaderm and tape  Assessment  Hemodynamics: stable  Attempts: 1  Outcomes: patient tolerated procedure well  Additional Notes  Pt voiced immediate relief   Tolerated well  PCEA explained AVU   See TD for BP/HR/Sats/FHT all ok     Very Minimal bleeding noted  Preanesthetic Checklist  Completed: patient identified, IV checked, site marked, risks and benefits discussed, surgical/procedural consents, equipment checked, pre-op evaluation, timeout performed, anesthesia consent given, oxygen available, monitors applied/VS acknowledged, fire risk safety assessment completed and verbalized and blood product R/B/A discussed and consented

## 2024-10-07 NOTE — PROGRESS NOTES
Labor Note    Jaleesa Montiel  [id number]  1990   38w1d      S:  Feeling comfortable with epidural.    O:    /68   Pulse 59   Temp 98.3 °F (36.8 °C) (Oral)   Resp 16   LMP 2023   SpO2 100%        Cervix 8/80/0    A/P:  34 y.o.  @ 38w1d- labor     - cervix has been 8cm since 1130a. Contractions inadequate until 12p. Now on 6 of pitocin. Station has improved since my last check. Suspect intermittently adequate. Will increase pitocin to 8 and monitor adequacy and fetal tolerance of labor. Discussed giving 1 more hour and will recheck then proceed with cs if not changing. Patient and family agree    Lala Grande MD  [facility]

## 2024-10-07 NOTE — PROGRESS NOTES
The patient was noted to have some additional vaginal bleeding of approximately 125cc bright red. uterus was moderately firm. VS were stable. Repeat vaginal exam by Dr. Diaz revealed a 1cm bleeding point on right vaginal sidewall. A figure of eight 2-0 chromic applied, which stopped bleeding. Ice pack applied. 800ug of cytotec applied rectally to give additional uterine tone. Bleeding appears controlled but will monitor closely.

## 2024-10-07 NOTE — PROGRESS NOTES
She reports having mild contractions but is overall comfortable.    Vitals:    10/06/24 1303 10/06/24 1917 10/06/24 2014 10/06/24 2032   BP: 131/80 (!) 141/84  120/67   Pulse: 72 66 52 69   Resp: 16 17     Temp: 98.6 °F (37 °C) 98.6 °F (37 °C)     TempSrc: Oral Oral     SpO2: 99% 99% 96%       FHR: 120 moderate variability, no accelerations, no decelerations, cat 1  [place] Walk: contractions q 4-5 minutes  SVE: 4/50/-3 vtx, adequate pelvis    Ass/Plan: 35 yo  at 38 wks in early labor, GBS neg, cat 1 fetal tracing  -pitocin augmentation at MN  -epidural prn

## 2024-10-07 NOTE — PROGRESS NOTES
Epidural Placement Event Times:    MD in Room: 0150  Timeout: 0156  Procedure Start: 0158  Catheter Placed: 0201  Test Dose: 0202  Dressin  Bolus Dose: 0205  MD out of Room: 0212

## 2024-10-07 NOTE — PROGRESS NOTES
Ob Hospitalist    I have received SBAR from Dr. Galdamez, have reviewed the patient's sana, and have assumed care of the patient. I introduced myself to the patient and she reports having adequate pain control and feeling sleepy after receiving iv dilaudid.    Vitals:    10/06/24 1303 10/06/24 191   BP: 131/80 (!) 141/84   Pulse: 72 66   Resp: 16 17   Temp: 98.6 °F (37 °C) 98.6 °F (37 °C)   TempSrc: Oral Oral   SpO2: 99% 99%      FHR: 120 moderate variability, no accelerations, no decelerations, cat 1  [place]: contractions q 4-6 minutes  SVE: deferred    Ass/Plan:  at 38 wks with early labor versus kinza vallejo.  Sporadic variable decelerations on fetal monitoring.  Antiphospholipid antibody on prophylactic lovenox with last dose at 0900.  History of IUFD.  Mild range blood pressures.  -therapeutic rest with dilaudid and compazine  -does eventually desire epidural for pain control  -if blood pressures remain elevated send preeclampsia labs

## 2024-10-07 NOTE — ANESTHESIA PRE PROCEDURE
5-40 mL  5-40 mL IntraVENous PRN Mini Galdamez MD       • 0.9 % sodium chloride infusion  25 mL IntraVENous PRN Mini Galdamez MD       • methylergonovine (METHERGINE) injection 200 mcg  200 mcg IntraMUSCular PRN Mini Galdamez MD       • carboprost (HEMABATE) injection 250 mcg  250 mcg IntraMUSCular PRN Mini Galdamez MD       • miSOPROStol (CYTOTEC) tablet 400 mcg  400 mcg Buccal PRN Mini Galdamez MD       • tranexamic acid-NaCl IVPB premix 1,000 mg  1,000 mg IntraVENous Once PRN Mini Galdamez MD       • oxytocin (PITOCIN) 30 units in 500 mL infusion  87.3 katy-units/min IntraVENous Continuous PRN Mini Galdamez MD        And   • oxytocin (PITOCIN) 10 unit bolus from the bag  10 Units IntraVENous PRN Mini Galdamez MD       • terbutaline (BRETHINE) injection 0.25 mg  0.25 mg SubCUTAneous Once PRN Mini Galdamez MD       • lidocaine PF 1 % injection 30 mL  30 mL Other PRN Mini Galdamez MD       • nitrous oxide 50% inhalation 1 each  1 each Inhalation Continuous PRMini Winters MD       • ondansetron (ZOFRAN) injection 4 mg  4 mg IntraVENous Q6H PRN Mini Galdamez MD        Or   • ondansetron (ZOFRAN-ODT) disintegrating tablet 4 mg  4 mg Oral Q6H PRN Mini Galdamez MD       • oxytocin (PITOCIN) 30 units in 500 mL infusion  1-20 katy-units/min IntraVENous Continuous Isael Louis MD 6 mL/hr at 10/07/24 0055 6 katy-units/min at 10/07/24 0055       Allergies:  No Known Allergies    Problem List:    Patient Active Problem List   Diagnosis Code   • Intrauterine fetal death at 20 weeks or more of gestation O36.4XX0   • History of IUFD Z87.59   • Antiphospholipid syndrome (HCC) D68.61   • Abnormal uterine contraction O62.9       Past Medical History:        Diagnosis Date   • Anemia    • Antiphospholipid antibody syndrome (HCC)    • Fetal demise, greater than 22 weeks, antepartum, single gestation     28 weeks gestation   • Multiple sclerosis (HCC)    • Syphilis        Past

## 2024-10-07 NOTE — PROGRESS NOTES
The patient was evaluated secondary to notification from the patient's nurse regarding late decelerations noted on the fetal heart tracing. The patient has an epidural and is comfortable.    Vitals:    10/07/24 0452 10/07/24 0456 10/07/24 0507 10/07/24 0510   BP:  (!) 111/56  116/66   Pulse: 52 54 69 56   Resp:       Temp:       TempSrc:       SpO2: 99%  99%       FHR: 145 moderate variability, repetitive late decels with at least 50% of contractions, cat 2  [place]: contractions q 2-4 minutes  SVE: 5/60/-3 vtx, BBOW, adequate pelvis, amniotomy- moderate amount of clear fluid, FSE and IUPC placed    Ass/Plan:  at 38 1/7 wks in early labor, h/o IUFD and APA, cat 2 fetal tracing  -patient positioned onto her far right side with peanut ball  -s/p iv fluid bolus  -continue to closely monitor fetal tracing  -Informed that if her the fetal tracing shows persistent evidence of fetal intolerance of labor despite fetal resuscitative efforts, a  would be recommended.

## 2024-10-07 NOTE — ANESTHESIA POSTPROCEDURE EVALUATION
Department of Anesthesiology  Postprocedure Note    Patient: Jaleesa Montiel  MRN: [id number]  YOB: 1990  Date of evaluation: 10/7/2024    Procedure Summary       Date: 10/07/24 Room / Location:     Anesthesia Start: 0158 Anesthesia Stop: 1720    Procedure: Labor Analgesia Diagnosis:     Scheduled Providers:  Responsible Provider: Robbie Kennedy MD    Anesthesia Type: epidural ASA Status: 3 - Emergent            Anesthesia Type: No value filed.    Joseph Phase I:      Joseph Phase II:      Anesthesia Post Evaluation    No notable events documented.

## 2024-10-07 NOTE — PROGRESS NOTES
Labor Note    Jaleesa Montiel  [id number]  1990   38w1d      S:  No pressure.    O:    /80   Pulse 61   Temp 98.3 °F (36.8 °C) (Oral)   Resp 16   LMP 2023   SpO2 98%        Cervix unchanged    A/P:  34 y.o.  @ 38w1d- labor     - Cervix remains unchanged. Contractions are more consistently adequate since increase in pitocin at 230p. Discussed can proceed with  at this time due to active phase arrest although made aware more urgent section called on floor. Given option to continue with pitocin to see if progress occurs or stop pitocin and proceed with  once more urgent section complete. Cat 1 tracing at this time.     Patient and family opt to continue with pitocin while awaiting other section. Will proceed emergently with  if heart rate tracing indicates.    Recheck 1-2hrs.    Lala Grande MD  [facility]

## 2024-10-08 LAB
HGB BLD-MCNC: 9.2 G/DL (ref 11.5–16)
T PALLIDUM AB SER QL IA: REACTIVE

## 2024-10-08 PROCEDURE — 6360000002 HC RX W HCPCS: Performed by: OBSTETRICS & GYNECOLOGY

## 2024-10-08 PROCEDURE — 36415 COLL VENOUS BLD VENIPUNCTURE: CPT

## 2024-10-08 PROCEDURE — 85018 HEMOGLOBIN: CPT

## 2024-10-08 PROCEDURE — 94761 N-INVAS EAR/PLS OXIMETRY MLT: CPT

## 2024-10-08 PROCEDURE — 6370000000 HC RX 637 (ALT 250 FOR IP): Performed by: OBSTETRICS & GYNECOLOGY

## 2024-10-08 PROCEDURE — 1120000000 HC RM PRIVATE OB

## 2024-10-08 RX ADMIN — DOCUSATE SODIUM 100 MG: 100 CAPSULE, LIQUID FILLED ORAL at 08:01

## 2024-10-08 RX ADMIN — IBUPROFEN 800 MG: 800 TABLET, FILM COATED ORAL at 11:06

## 2024-10-08 RX ADMIN — ENOXAPARIN SODIUM 40 MG: 100 INJECTION SUBCUTANEOUS at 06:30

## 2024-10-08 RX ADMIN — IBUPROFEN 800 MG: 800 TABLET, FILM COATED ORAL at 18:49

## 2024-10-08 ASSESSMENT — PAIN DESCRIPTION - DESCRIPTORS
DESCRIPTORS: SORE;DISCOMFORT
DESCRIPTORS: CRAMPING

## 2024-10-08 ASSESSMENT — PAIN DESCRIPTION - LOCATION
LOCATION: ABDOMEN
LOCATION: PERINEUM

## 2024-10-08 ASSESSMENT — PAIN - FUNCTIONAL ASSESSMENT
PAIN_FUNCTIONAL_ASSESSMENT: ACTIVITIES ARE NOT PREVENTED
PAIN_FUNCTIONAL_ASSESSMENT: ACTIVITIES ARE NOT PREVENTED

## 2024-10-08 ASSESSMENT — PAIN DESCRIPTION - ORIENTATION
ORIENTATION: LOWER
ORIENTATION: LOWER

## 2024-10-08 ASSESSMENT — PAIN SCALES - GENERAL
PAINLEVEL_OUTOF10: 5
PAINLEVEL_OUTOF10: 4

## 2024-10-08 NOTE — DISCHARGE SUMMARY
Obstetrical Discharge Summary     Name: Jaleesa Montiel MRN: [id number]  SSN: [id number]    YOB: 1990  Age: 34 y.o.  Sex: female      Admit Date: 10/6/2024    Discharge Date: 10/9/24     Attending Physician:  Sada Hernandez MD     Delivering Physician:  Paz Cohen MD     * Admission Diagnoses:   IUP @ 38w1d         * Discharge Diagnoses:   Delivery of a VMI via  by Jignesh Diaz MD on 10/8/2024.  Apgars were 9 and 9.      Right Vulvar hematoma      Additional Diagnoses:  No components found for: \"OBEXTABORH\", \"OBEXTABSCRN\", \"OBEXTRUBELLA\", \"OBEXTGRBS\" There is no immunization history for the selected administration types on file for this patient.    * Procedures:            * Discharge Condition: good    * Hospital Course: Normal hospital course following the delivery.    * Disposition: Home    Discharge Medications:      Medication List        ASK your doctor about these medications      aspirin 81 MG chewable tablet     ferrous sulfate 75 (15 Fe) MG/ML solution  Commonly known as: ALBERTO-IN-SOL     Lovenox 40 MG/0.4ML  Generic drug: enoxaparin     PNV PO     polyethylene glycol 17 g Pack packet  Commonly known as: MIRALAX              * Follow-up Care/Patient Instructions:  Activity: Activity as tolerated  Diet: Regular Diet  Wound Care: As directed      Followup 6 weeks for PP check        Signed By:  Paz Cohen MD     2024

## 2024-10-08 NOTE — CARE COORDINATION
10/8/2024  12:44 PM    CM met with KRISTA to complete initial assessment and begin discharge planning.  MOB verified and confirmed demographics.  KRISTA lives with family, at the address on file. KRISTA reports she has good family support, and feels like she has the support she needs when she returns home.  KRISTA plans to breast feed baby and has pump to use at home.   Pediatrics will provide follow up care for infant. KRISTA has car seat, bassinet/crib, clothing, bottles and all necessary supplies for baby.        10/08/24 1244   Service Assessment   Patient Orientation Alert and Oriented   Cognition Alert   History Provided By Patient   Primary Caregiver Self   Support Systems Family Members   PCP Verified by CM Yes   Last Visit to PCP Within last 3 months   Prior Functional Level Independent in ADLs/IADLs   Current Functional Level Independent in ADLs/IADLs   Can patient return to prior living arrangement Yes   Ability to make needs known: Good   Family able to assist with home care needs: Yes   Would you like for me to discuss the discharge plan with any other family members/significant others, and if so, who? No   Financial Resources Other (Comment)     Wayne Deng CM

## 2024-10-08 NOTE — DISCHARGE INSTRUCTIONS
Discharge Instructions for Vaginal Delivery    Patient ID:  Jaleesa Montiel  [id number]  34 y.o.  1990    Take Home Medications       Continue taking your prenatal vitamins if you are breastfeeding.    Follow-up care is a key part of your treatment and safety. Please schedule and keep appointments.  Follow-up with your primary OB in 6 weeks.    Activity  Avoid anything in your vagina for 6 weeks (no intercourse, tampons, or douching).  You may drive unless you are taking prescription pain medications.  Climbing stairs and light lifting are okay.  Please avoid excessive exercise, though walking is okay- you'll be tired!    Diet  Regular diet as tolerated.  Be sure to drink plenty of fluids if you are breastfeeding.    Wound care  If you have stitches, continue to rinse with a squirt bottle of warm water each time you void for about 7-10 days..  Your stitches will gradually dissolve over four to eight weeks.  Sitz baths are also helpful to keep the wound clean, encourage healing, and to help with pain associated with the stitches or hemorrhoids.  You can use either a sitz bath basin or a bathtub filled with 2-3\" inches of plain warm water.  Soak for 10 minutes 3 times a day as tolerated.    Pain Management  Over the counter medications such as Tylenol and ibuprofen (Motrin or Advil) are ideal.  These may be taken together, alternating doses.  You may  take the maximum dose:  Motrin or Advil (generic ibuprofen), either 3 tablets every 6 hours or 4 tablets every 8 hours or Tylenol (acetominophen) 1000mg every 6 hours (equivalent to 2 extra strength Tylenol).  You may also have a precrescription for stronger pain medication.  Take only as needed and transition to over the counter medication in the next few days. Minimize amounts of the prescription medication, as it can be habit-forming and will worsen or cause constipation. Most patients will find that within a couple of days, their pain is adequately controlled using

## 2024-10-08 NOTE — PROGRESS NOTES
PostPartum Note    Jaleesa Montiel  [id number]  1990  34 y.o.    S:  Ms. Jaleesa Montiel is a 34 y.o.  PPD #1 s/p  @ 38w1d.  Doing well.  She had a baby boy.  Her lochia is like a period.  She describes her pain as mild and is well controlled with PO medications.   She is ambulating and voiding.  Tolerating PO intake.  Denies toxic symptoms. Reports vulvar pain is well controlled, better than yesterday.    O:   /82   Pulse 59   Temp 98.2 °F (36.8 °C) (Oral)   Resp 20   LMP 2023   SpO2 99%   Breastfeeding Unknown     Lab Results   Component Value Date/Time    WBC 10.3 10/06/2024 06:31 PM    HGB 9.2 10/08/2024 06:45 AM    HCT 29.7 10/06/2024 06:31 PM     10/06/2024 06:31 PM    MCV 88.1 10/06/2024 06:31 PM       Gen - No acute distress  Abdomen - Fundus firm, below the umbilicus   Ext - Warm, well perfused.  Nontender  : right vulvar hematoma, ~2cm in size, no erythema or color changes noted. Scant blood on pad.    A/P:  PPD #1 s/p  @ 38w1d, c/b  APLAS and h/o latent syphilis treated in this pregnancy, with vulvar hematoma, stable.    1.  Routine PP instructions/ care discussed  2.  Circumcision desired - per nursing staff, baby not feeding well with only one wet diaper, requested that circ be deferred until tomorrow.   3. APLAS: on lovenox 40qd   4. Vulvar hematoma: pain well controlled, does not appear to be expanding. Hgb 10.0>9.2. Will continue to monitoro closely.   5. Discharge PP2   6.  F/U 4-6 weeks for PP check.      Paz Cohen MD  [facility]

## 2024-10-08 NOTE — LACTATION NOTE
This note was copied from a baby's chart.  Mother on a video call with family, baby asleep in [facility].  Mother states baby is latching well to left breast but having a harder time on right.  Positioning baby at breast reviewed.  BF basics reviewed.  Mothers questions answered.     Discussed with mother her plan for feeding.  Reviewed the benefits of exclusive breast milk feeding during the hospital stay.  She acknowledges understanding of information reviewed and states that it is her plan to breastfeed her infant.  Will support her choice and offer additional information as needed.     Reviewed breastfeeding basics:  How milk is made and normal  breastfeeding behaviors discussed.  Supply and demand,  stomach size, early feeding cues, skin to skin bonding with comfortable positioning and baby led latch-on reviewed.  How to identify signs of successful breastfeeding sessions reviewed; education on normal  feeding frequency and duration and expected infant output discussed.  Breastfeeding Booklet and Warm line information provided with discussion.  Discussed typical  weight loss and the importance of pediatrician appointment within 24-48 hours of discharge, at 2 weeks of life and normalcy of requesting pediatric weight checks as needed in between visits.       Pt will successfully establish breastfeeding by feeding in response to early feeding cues   or wake every 3h, will obtain deep latch, and will keep log of feedings/output.  Taught to BF at hunger cues and or q 2-3 hrs and to offer 10-20 drops of hand expressed colostrum at any non-feeds.            Maternal Response: Comfortable     Latch:  (did not see baby at breast)

## 2024-10-08 NOTE — PROGRESS NOTES
1900: Assumed care of patient. Patient reports mild pain following labial repairs. Patient denies any HA, RUQ pain, N/V, vision changes.     2245: TRANSFER - OUT REPORT:    Verbal report given to Tracy on Jaleesa Montiel  being transferred to [facility] for routine progression of patient care       Report consisted of patient's Situation, Background, Assessment and   Recommendations(SBAR).     Information from the following report(s) Nurse Handoff Report and Adult Overview was reviewed with the receiving nurse.           Lines:   Peripheral IV 10/07/24 Posterior;Right Hand (Active)   Site Assessment Clean, dry & intact 10/07/24 1925   Line Status Infusing 10/07/24 1925   Line Care Connections checked and tightened 10/07/24 1925   Phlebitis Assessment No symptoms 10/07/24 1925   Infiltration Assessment 0 10/07/24 1925   Alcohol Cap Used Yes 10/07/24 1925   Dressing Status Clean, dry & intact 10/07/24 1925   Dressing Type Transparent 10/07/24 1925   Dressing Intervention New 10/07/24 1925        Opportunity for questions and clarification was provided.      Patient transported with:  Registered Nurse

## 2024-10-08 NOTE — PLAN OF CARE
Problem: Vaginal Birth or  Section  Goal: Fetal and maternal status remain reassuring during the birth process  Description:  Birth OB-Pregnancy care plan goal which identifies if the fetal and maternal status remain reassuring during the birth process  Outcome: Progressing     Problem: Pain  Goal: Verbalizes/displays adequate comfort level or baseline comfort level  Outcome: Progressing     Problem: Infection - Adult  Goal: Absence of infection at discharge  Outcome: Progressing  Goal: Absence of infection during hospitalization  Outcome: Progressing  Goal: Absence of fever/infection during anticipated neutropenic period  Outcome: Progressing     Problem: Safety - Adult  Goal: Free from fall injury  Outcome: Progressing     Problem: Chronic Conditions and Co-morbidities  Goal: Patient's chronic conditions and co-morbidity symptoms are monitored and maintained or improved  Outcome: Progressing     
RN  Outcome: Progressing  10/7/2024 0724 by Jennifer Fontana RN  Outcome: Progressing     Problem: Infection - Adult  Goal: Absence of infection at discharge  10/7/2024 2038 by Gatito Phillips RN  Outcome: Progressing  10/7/2024 1535 by Jennifer Fontana RN  Outcome: Progressing  Flowsheets (Taken 10/7/2024 1525)  Absence of infection at discharge:   Assess and monitor for signs and symptoms of infection   Monitor lab/diagnostic results   Monitor all insertion sites i.e., indwelling lines, tubes and drains  10/7/2024 0724 by Jennifer Fontana RN  Outcome: Progressing  Goal: Absence of infection during hospitalization  10/7/2024 2038 by Gatito Phillips RN  Outcome: Progressing  10/7/2024 1535 by Jennifer Fontana RN  Outcome: Progressing  Flowsheets (Taken 10/7/2024 1525)  Absence of infection during hospitalization:   Assess and monitor for signs and symptoms of infection   Monitor lab/diagnostic results   Monitor all insertion sites i.e., indwelling lines, tubes and drains  10/7/2024 0724 by Jennifer Fontana RN  Outcome: Progressing  Goal: Absence of fever/infection during anticipated neutropenic period  10/7/2024 2038 by Gatito Phillips RN  Outcome: Progressing  Flowsheets (Taken 10/7/2024 1525 by Jennifer Fontana RN)  Absence of fever/infection during anticipated neutropenic period: Monitor white blood cell count  10/7/2024 0724 by Jennifer Fontana RN  Outcome: Progressing

## 2024-10-09 VITALS
OXYGEN SATURATION: 99 % | HEART RATE: 68 BPM | SYSTOLIC BLOOD PRESSURE: 116 MMHG | DIASTOLIC BLOOD PRESSURE: 76 MMHG | RESPIRATION RATE: 16 BRPM | TEMPERATURE: 97.5 F

## 2024-10-09 LAB
COMMENT:: NORMAL
SPECIMEN HOLD: NORMAL

## 2024-10-09 PROCEDURE — 94761 N-INVAS EAR/PLS OXIMETRY MLT: CPT

## 2024-10-09 PROCEDURE — 6360000002 HC RX W HCPCS: Performed by: OBSTETRICS & GYNECOLOGY

## 2024-10-09 RX ORDER — IBUPROFEN 800 MG/1
800 TABLET, FILM COATED ORAL EVERY 8 HOURS PRN
Qty: 40 TABLET | Refills: 1 | Status: SHIPPED | OUTPATIENT
Start: 2024-10-09

## 2024-10-09 RX ADMIN — ENOXAPARIN SODIUM 40 MG: 100 INJECTION SUBCUTANEOUS at 11:05

## 2024-10-09 NOTE — LACTATION NOTE
This note was copied from a baby's chart.  Mother reports that breast feeding is going well, this is her first child to breast feed. She is nursing on demand or every 2-3 hours. Her milk is coming in and she is feeling engorged. LC taught mother about how to manage her engorgement. Diaper output and weight loss discussed. Mother to call for further lactation assistance. Lactation warm line and group information provided.     Reviewed breastfeeding basics:  How milk is made and normal  breastfeeding behaviors discussed.  Supply and demand,  stomach size, early feeding cues, skin to skin bonding with comfortable positioning and baby led latch-on reviewed.  How to identify signs of successful breastfeeding sessions reviewed; education on asymetrical latch, signs of effective latching vs shallow, in-effective latching, normal  feeding frequency and duration and expected infant output discussed.  Normal course of breastfeeding discussed including the AAP's recommendation that children receive exclusive breast milk feedings for the first six months of life with breast milk feedings to continue through the first year of life and/or beyond as complimentary table foods are added.  Breastfeeding Booklet and Warm line information provided with discussion.  Discussed typical  weight loss and the importance of pediatrician appointment within 24-48 hours of discharge, at 2 weeks of life and normalcy of requesting pediatric weight checks as needed in between visits.     Engorgement Care Guidelines:  Reviewed how milk is made and normal phases of milk production.  Taught care of engorged breasts - frequent breastfeeding encouraged, cool packs and motrin as tolerated.  Anticipatory guidance shared.    Pt will successfully establish breastfeeding by feeding in response to early feeding cues or wake every 3h, will obtain deep latch, and will keep log of feedings/output.  Taught to BF at hunger cues and or q

## 2024-10-09 NOTE — PROGRESS NOTES
Post-Partum Day Number 2 Progress Note    Patient doing well post-partum without significant complaints.  Voiding without difficulty, normal lochia.    Vitals:  Patient Vitals for the past 24 hrs:   BP Temp Temp src Pulse Resp SpO2   10/09/24 0736 116/76 97.5 °F (36.4 °C) -- 68 16 99 %   10/08/24 2308 134/83 97.3 °F (36.3 °C) Oral 58 16 99 %   10/08/24 1422 112/66 97.9 °F (36.6 °C) -- 56 16 100 %     Temp (24hrs), Av.6 °F (36.4 °C), Min:97.3 °F (36.3 °C), Max:97.9 °F (36.6 °C)      Vital signs stable, afebrile.    Exam:  Patient without distress.               Abdomen soft, fundus firm, nontender  :  right sided swelling present.  Small 1-2cm vulvar hematoma present, no sign of infection, no skin changes.  Minimally tender.               Lower extremities, MOHINI nontender w/o edema    Labs:   Recent Results (from the past 24 hour(s))   Extra Tubes Hold    Collection Time: 10/09/24 12:38 AM   Result Value Ref Range    Specimen HOld 1UA     Comment:        Add-on orders for these samples will be processed based on acceptable specimen integrity and analyte stability, which may vary by analyte.       Assessment and Plan:  PPD 2 s/p    VMI / Rh pos / Rubella immune / circ done this am  Routine postpartum/postop care.  Discharge today-instructions reviewed, follow up in office in 1 week.  APLAS:  continue lovenox, pt has Rx filled at home.  Vulvar hematoma:  stable/decreasing in size.  Precautions and home care reviewed.    Pat Sam DO, FACOG  [facility]

## 2024-10-09 NOTE — PROGRESS NOTES
Patient discharged home in stable condition, Discharge paper work reviewed and signed, bands verified and cut, patient understands when to follow up for herself and the infant, she understands that she should not take percocet and tylenol together, no further questions,gift pack given, infant placed in car seat by parents, patient escorted out by volunteers

## 2025-05-01 DIAGNOSIS — Z34.90 PREGNANCY, UNSPECIFIED GESTATIONAL AGE: Primary | ICD-10-CM

## 2025-05-02 ENCOUNTER — ROUTINE PRENATAL (OUTPATIENT)
Age: 35
End: 2025-05-02

## 2025-05-02 VITALS — HEART RATE: 64 BPM | SYSTOLIC BLOOD PRESSURE: 132 MMHG | DIASTOLIC BLOOD PRESSURE: 79 MMHG

## 2025-05-02 DIAGNOSIS — Z34.90 PREGNANCY, UNSPECIFIED GESTATIONAL AGE: ICD-10-CM

## 2025-05-02 NOTE — PROCEDURES
PATIENT: FAVIOLA JUSTICE   -  : 1990   -  DOS:2025   -  INTERPRETING PROVIDER:Satish Ward,   Indication  ========    hx 28 IUFD, first trimester scan    Method  ======    Transabdominal ultrasound examination. View: Sufficient    Pregnancy  =========    Lamb pregnancy. Number of fetuses: 1    Dating  ======    LMP on: 2025  GA by LMP 12 w + 6 d  KATIE by LMP: 2025  Ultrasound examination on: 2025  GA by U/S based upon: CRL  GA by U/S 13 w + 0 d  KATIE by U/S: 2025  Assigned: based on the LMP, selected on 2025  Assigned GA 12 w + 6 d  Assigned KATIE: 2025    General Evaluation  ==============    Cardiac activity present  Amniotic fluid: normal amount    Fetal Biometry  ============    Standard   bpm  19% Nicolaides  CRL 67.6 mm 13w 0d 56% Hadlock    Fetal Anatomy  ===========    The following structures appear normal:  Stomach. Bladder.    The following structures were visualized:  Cranium: Midline falx  Choroid plexus. Face: Profile. Abdominal wall: Intact. Arms. Legs.    Maternal Structures  ===============    Ovaries / Tubes / Adnexa  Rt ovary: Not visualized  Lt ovary: Visualized  Lt ovary D1 3.7 cm  Lt ovary D2 2.5 cm  Lt ovary D3 2.1 cm  Lt ovary Vol 10.1 cm³    Findings  =======    Intrauterine Lamb pregnancy at 12w 6d by clinical dates.  Cardiac activity is present.  Due to early gestation, limited anatomy visualized is stated above.  Right ovary is Not visualized.  Left ovary is Visualized.    The ultrasound findings as listed above and diagnostic limitations of ultrasound imaging, including inability to exclude all anomalies, have been reviewed with the patient. All  questions and concerns addressed.    Consultation  ==========    FAVIOLA is 35 yrs of age,  at 12w 6d.      1) hx of fetal demise at 28 weeks/APS  -diagnosed on the basis of anticardiolipin and anti-beta 2 glycoprotein antibodies. It is unclear if the lupus  anticoagulant was

## 2025-05-30 ENCOUNTER — ROUTINE PRENATAL (OUTPATIENT)
Age: 35
End: 2025-05-30
Payer: COMMERCIAL

## 2025-05-30 VITALS — SYSTOLIC BLOOD PRESSURE: 121 MMHG | HEART RATE: 61 BPM | DIASTOLIC BLOOD PRESSURE: 79 MMHG

## 2025-05-30 DIAGNOSIS — Z34.90 PREGNANCY, UNSPECIFIED GESTATIONAL AGE: ICD-10-CM

## 2025-05-30 PROCEDURE — 99214 OFFICE O/P EST MOD 30 MIN: CPT | Performed by: STUDENT IN AN ORGANIZED HEALTH CARE EDUCATION/TRAINING PROGRAM

## 2025-05-30 PROCEDURE — 76816 OB US FOLLOW-UP PER FETUS: CPT | Performed by: STUDENT IN AN ORGANIZED HEALTH CARE EDUCATION/TRAINING PROGRAM

## 2025-05-30 RX ORDER — ASPIRIN 81 MG/1
81 TABLET ORAL DAILY
COMMUNITY

## 2025-06-02 NOTE — PROGRESS NOTES
Patient was seen 6/2/2025      Please look under media to view full consult and ultrasound report in View[employer].         Leah Johnson MD   Maternal Fetal Medicine

## 2025-06-02 NOTE — PROCEDURES
PATIENT: FAVIOLA JUSTICE   -  : 1990   -  DOS:2025   -  INTERPRETING PROVIDER:Leah Johnson,   Indication  ========    hx 28 IUFD    Method  ======    Transabdominal ultrasound examination. View: suboptimal due to early gestational age    Pregnancy  =========    Lamb pregnancy. Number of fetuses: 1    Dating  ======    LMP on: 2025  GA by LMP 16 w + 6 d  KATIE by LMP: 2025  Ultrasound examination on: 2025  GA by U/S based upon: AC, BPD, Femur, HC  GA by U/S 17 w + 2 d  KATIE by U/S: 2025  Assigned: based on the LMP, selected on 2025  Assigned GA 16 w + 6 d  Assigned KATIE: 2025    Fetal Biometry  ============    Standard  BPD 37.2 mm 17w 3d 73% Hadlock  OFD 48.1 mm 18w 0d 86% Ryan  .5 mm 17w 1d 56% Hadlock  .8 mm 16w 6d 49% Hadlock  Femur 25.1 mm 17w 4d 73% Hadlock   g 17w 1d 66% Hadlock  EFW (lb) 0 lb  EFW (oz) 7 oz  EFW by: Hadlock (BPD-HC-AC-FL)  Other Structures   bpm    General Evaluation  ==============    Cardiac activity present.  bpm. Fetal movements: visualized. Presentation: Transverse, head to maternal right  Placenta: Placental site: anterior, appropriate distance from the internal os  Umbilical cord: Cord vessels: 3 vessel cord. Insertion site: central  Amniotic fluid: Amount of AF: normal    Fetal Anatomy  ===========    Cranium: normal  Lateral ventricles: normal  Cerebellum: normal  Cisterna magna: normal  Cord insertion: normal  Stomach: normal  Kidneys: normal  Bladder: normal  Genitals: normal  Wants to know fetal sex: yes    Maternal Structures  ===============    Uterus / Cervix  Cervix: Normal  Approach: Transabdominal  Cervical length 4.11 cm  Ovaries / Tubes / Adnexa  Rt ovary: Normal  Rt ovary D1 2.1 cm  Rt ovary D2 1.9 cm  Rt ovary D3 1.7 cm  Rt ovary Vol 3.6 cm³  Lt ovary: Not visualized    Findings  =======    Intrauterine Lamb pregnancy at 16w 6d by clinical dates.  EFW is 186 g at 66%, abdominal

## 2025-06-25 ENCOUNTER — ROUTINE PRENATAL (OUTPATIENT)
Age: 35
End: 2025-06-25
Payer: COMMERCIAL

## 2025-06-25 VITALS — SYSTOLIC BLOOD PRESSURE: 118 MMHG | DIASTOLIC BLOOD PRESSURE: 70 MMHG | HEART RATE: 55 BPM

## 2025-06-25 DIAGNOSIS — Z34.90 PREGNANCY, UNSPECIFIED GESTATIONAL AGE: ICD-10-CM

## 2025-06-25 PROCEDURE — 99204 OFFICE O/P NEW MOD 45 MIN: CPT | Performed by: STUDENT IN AN ORGANIZED HEALTH CARE EDUCATION/TRAINING PROGRAM

## 2025-06-25 PROCEDURE — 76811 OB US DETAILED SNGL FETUS: CPT | Performed by: STUDENT IN AN ORGANIZED HEALTH CARE EDUCATION/TRAINING PROGRAM

## 2025-06-25 NOTE — PROCEDURES
PATIENT: FAVIOLA JUSTICE   -  : 1990   -  DOS:2025   -  INTERPRETING PROVIDER:Leah Johnson,   Indication  ========    hx 28 IUFD    Method  ======    Transabdominal ultrasound examination. View: Good view    Dating  ======    LMP on: 2025  GA by LMP 20 w + 4 d  KATIE by LMP: 2025  Ultrasound examination on: 2025  GA by U/S based upon: AC, BPD, Femur, HC  GA by U/S 20 w + 4 d  KATIE by U/S: 2025  Assigned: based on the LMP, selected on 2025  Assigned GA 20 w + 4 d  Assigned KATIE: 2025    Fetal Growth Overview  =================    Exam date        GA              BPD (mm)          HC (mm)              AC (mm)               FL (mm)             HL (mm)          EFW (g)  2025        16w 6d        37.2     73%        137.5    56%        109.8     49%        25.1    73%                               186    66%  2025        20w 4d        48.1     48%        177.4    26%        156.4     51%        33.4    38%        32     55%        364    46%    Fetal Biometry  ============    Standard  BPD 48.1 mm 20w 4d 48% Hadlock  OFD 62.6 mm 21w 3d 78% Ryan  .4 mm 20w 2d 26% Hadlock  Cerebellum tr 21.1 mm 20w 0d 49% Hill  Nuchal fold 5.3 mm  .4 mm 20w 6d 51% Hadlock  Femur 33.4 mm 20w 3d 38% Hadlock  Humerus 32.0 mm 20w 5d 55% Ryan   g 20w 3d 46% Hadlock  EFW (lb) 0 lb  EFW (oz) 13 oz  EFW by: Hadlock (BPD-HC-AC-FL)  Extended   5.9 mm  CM 4.4 mm  26% Nicolaides  Nasal bone 6.3 mm  Head / Face / Neck  Nasal bone: present  Other Structures   bpm    General Evaluation  ==============    Cardiac activity present.  bpm. Fetal movements: visualized. Presentation: BREECH  Placenta: Placental site: anterior, appropriate distance from the internal os. Placental edge-to-cervical os distance 5.7 cm  Umbilical cord: Cord vessels: 3 vessel cord, 3 vessel cord. Insertion site: central  Amniotic fluid: Amount of AF: normal. MVP 4.1 cm    Fetal

## 2025-06-25 NOTE — PROGRESS NOTES
Patient was seen 6/25/2025      Please look under media to view full consult and ultrasound report in [employer].         Leah Johnson MD   Maternal Fetal Medicine